# Patient Record
Sex: FEMALE | Race: WHITE | NOT HISPANIC OR LATINO | URBAN - METROPOLITAN AREA
[De-identification: names, ages, dates, MRNs, and addresses within clinical notes are randomized per-mention and may not be internally consistent; named-entity substitution may affect disease eponyms.]

---

## 2017-01-01 ENCOUNTER — INPATIENT (INPATIENT)
Facility: HOSPITAL | Age: 82
LOS: 8 days | Discharge: NURSING, REHAB OR LONG-TERM CARE | End: 2017-01-10
Attending: INTERNAL MEDICINE | Admitting: FAMILY MEDICINE
Payer: MEDICARE

## 2017-01-01 PROCEDURE — 71010: CPT | Mod: 26

## 2017-01-01 PROCEDURE — 93010 ELECTROCARDIOGRAM REPORT: CPT

## 2017-01-01 PROCEDURE — 73552 X-RAY EXAM OF FEMUR 2/>: CPT | Mod: 26

## 2017-01-01 PROCEDURE — 73502 X-RAY EXAM HIP UNI 2-3 VIEWS: CPT | Mod: 26,LT

## 2017-01-01 PROCEDURE — 99285 EMERGENCY DEPT VISIT HI MDM: CPT

## 2017-01-01 PROCEDURE — 73562 X-RAY EXAM OF KNEE 3: CPT | Mod: 26,LT

## 2017-01-02 PROCEDURE — 93010 ELECTROCARDIOGRAM REPORT: CPT

## 2017-01-02 PROCEDURE — 93306 TTE W/DOPPLER COMPLETE: CPT | Mod: 26

## 2017-01-03 PROCEDURE — 71250 CT THORAX DX C-: CPT | Mod: 26

## 2017-01-03 PROCEDURE — 71010: CPT | Mod: 26

## 2017-01-04 PROCEDURE — 78582 LUNG VENTILAT&PERFUS IMAGING: CPT | Mod: 26

## 2017-01-04 PROCEDURE — 71010: CPT | Mod: 26

## 2017-01-05 PROCEDURE — 71010: CPT | Mod: 26

## 2017-01-05 PROCEDURE — 93010 ELECTROCARDIOGRAM REPORT: CPT

## 2017-01-06 PROCEDURE — 76770 US EXAM ABDO BACK WALL COMP: CPT | Mod: 26

## 2017-01-13 DIAGNOSIS — S72.142A DISPLACED INTERTROCHANTERIC FRACTURE OF LEFT FEMUR, INITIAL ENCOUNTER FOR CLOSED FRACTURE: ICD-10-CM

## 2017-01-13 DIAGNOSIS — F17.200 NICOTINE DEPENDENCE, UNSPECIFIED, UNCOMPLICATED: ICD-10-CM

## 2017-01-13 DIAGNOSIS — J95.89 OTHER POSTPROCEDURAL COMPLICATIONS AND DISORDERS OF RESPIRATORY SYSTEM, NOT ELSEWHERE CLASSIFIED: ICD-10-CM

## 2017-01-13 DIAGNOSIS — J44.1 CHRONIC OBSTRUCTIVE PULMONARY DISEASE WITH (ACUTE) EXACERBATION: ICD-10-CM
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
## 2017-01-13 DIAGNOSIS — J98.11 ATELECTASIS: ICD-10-CM

## 2017-01-13 DIAGNOSIS — J95.821 ACUTE POSTPROCEDURAL RESPIRATORY FAILURE: ICD-10-CM

## 2017-01-13 DIAGNOSIS — E03.9 HYPOTHYROIDISM, UNSPECIFIED: ICD-10-CM

## 2017-01-13 DIAGNOSIS — J84.10 PULMONARY FIBROSIS, UNSPECIFIED: ICD-10-CM

## 2017-01-13 DIAGNOSIS — D63.8 ANEMIA IN OTHER CHRONIC DISEASES CLASSIFIED ELSEWHERE: ICD-10-CM

## 2017-01-13 DIAGNOSIS — D62 ACUTE POSTHEMORRHAGIC ANEMIA: ICD-10-CM

## 2017-01-13 DIAGNOSIS — I50.31 ACUTE DIASTOLIC (CONGESTIVE) HEART FAILURE: ICD-10-CM

## 2017-01-13 DIAGNOSIS — I48.91 UNSPECIFIED ATRIAL FIBRILLATION: ICD-10-CM

## 2017-01-13 DIAGNOSIS — I45.10 UNSPECIFIED RIGHT BUNDLE-BRANCH BLOCK: ICD-10-CM
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
## 2017-01-17 DIAGNOSIS — J18.9 PNEUMONIA, UNSPECIFIED ORGANISM: ICD-10-CM

## 2017-01-30 ENCOUNTER — INPATIENT (INPATIENT)
Facility: HOSPITAL | Age: 82
LOS: 8 days | Discharge: NURSING, REHAB OR LONG-TERM CARE | End: 2017-02-08
Attending: FAMILY MEDICINE | Admitting: INTERNAL MEDICINE
Payer: MEDICARE

## 2017-01-30 VITALS
SYSTOLIC BLOOD PRESSURE: 107 MMHG | OXYGEN SATURATION: 95 % | HEART RATE: 87 BPM | RESPIRATION RATE: 16 BRPM | DIASTOLIC BLOOD PRESSURE: 58 MMHG | TEMPERATURE: 98 F | WEIGHT: 160.06 LBS

## 2017-01-30 DIAGNOSIS — Z96.642 PRESENCE OF LEFT ARTIFICIAL HIP JOINT: Chronic | ICD-10-CM

## 2017-01-30 LAB
ADD ON TEST-SPECIMEN IN LAB: SIGNIFICANT CHANGE UP
ALBUMIN SERPL ELPH-MCNC: 2.8 G/DL — LOW (ref 3.3–5)
ALP SERPL-CCNC: 97 U/L — SIGNIFICANT CHANGE UP (ref 40–120)
ALT FLD-CCNC: 17 U/L — SIGNIFICANT CHANGE UP (ref 12–78)
ANION GAP SERPL CALC-SCNC: 9 MMOL/L — SIGNIFICANT CHANGE UP (ref 5–17)
AST SERPL-CCNC: 15 U/L — SIGNIFICANT CHANGE UP (ref 15–37)
BASE EXCESS BLDA CALC-SCNC: 0 MMOL/L — SIGNIFICANT CHANGE UP (ref -2–2)
BASE EXCESS BLDA CALC-SCNC: 2 MMOL/L — SIGNIFICANT CHANGE UP (ref -2–2)
BASOPHILS # BLD AUTO: 0.1 K/UL — SIGNIFICANT CHANGE UP (ref 0–0.2)
BASOPHILS NFR BLD AUTO: 0.9 % — SIGNIFICANT CHANGE UP (ref 0–2)
BILIRUB SERPL-MCNC: 0.7 MG/DL — SIGNIFICANT CHANGE UP (ref 0.2–1.2)
BUN SERPL-MCNC: 38 MG/DL — HIGH (ref 7–23)
CALCIUM SERPL-MCNC: 8.8 MG/DL — SIGNIFICANT CHANGE UP (ref 8.5–10.1)
CHLORIDE SERPL-SCNC: 96 MMOL/L — SIGNIFICANT CHANGE UP (ref 96–108)
CO2 SERPL-SCNC: 32 MMOL/L — HIGH (ref 22–31)
CREAT SERPL-MCNC: 2.58 MG/DL — HIGH (ref 0.5–1.3)
EOSINOPHIL # BLD AUTO: 0.2 K/UL — SIGNIFICANT CHANGE UP (ref 0–0.5)
EOSINOPHIL NFR BLD AUTO: 2.6 % — SIGNIFICANT CHANGE UP (ref 0–6)
FLUAV H1 2009 PAND RNA SPEC QL NAA+PROBE: DETECTED
GAS PNL BLDA: SIGNIFICANT CHANGE UP
GLUCOSE SERPL-MCNC: 77 MG/DL — SIGNIFICANT CHANGE UP (ref 70–99)
HCO3 BLDA-SCNC: 25 MMOL/L — SIGNIFICANT CHANGE UP (ref 21–29)
HCO3 BLDA-SCNC: 27 MMOL/L — SIGNIFICANT CHANGE UP (ref 21–29)
HCT VFR BLD CALC: 33.9 % — LOW (ref 34.5–45)
HGB BLD-MCNC: 10.9 G/DL — LOW (ref 11.5–15.5)
INR BLD: 5 RATIO — CRITICAL HIGH (ref 0.88–1.16)
LYMPHOCYTES # BLD AUTO: 1 K/UL — SIGNIFICANT CHANGE UP (ref 1–3.3)
LYMPHOCYTES # BLD AUTO: 16.4 % — SIGNIFICANT CHANGE UP (ref 13–44)
MCHC RBC-ENTMCNC: 31.2 PG — SIGNIFICANT CHANGE UP (ref 27–34)
MCHC RBC-ENTMCNC: 32.2 GM/DL — SIGNIFICANT CHANGE UP (ref 32–36)
MCV RBC AUTO: 97 FL — SIGNIFICANT CHANGE UP (ref 80–100)
MONOCYTES # BLD AUTO: 0.4 K/UL — SIGNIFICANT CHANGE UP (ref 0–0.9)
MONOCYTES NFR BLD AUTO: 6.9 % — SIGNIFICANT CHANGE UP (ref 2–14)
NEUTROPHILS # BLD AUTO: 4.4 K/UL — SIGNIFICANT CHANGE UP (ref 1.8–7.4)
NEUTROPHILS NFR BLD AUTO: 73.2 % — SIGNIFICANT CHANGE UP (ref 43–77)
PCO2 BLDA: 45 MMHG — SIGNIFICANT CHANGE UP (ref 32–46)
PCO2 BLDA: 47 MMHG — HIGH (ref 32–46)
PH BLDA: 7.36 — SIGNIFICANT CHANGE UP (ref 7.35–7.45)
PH BLDA: 7.37 — SIGNIFICANT CHANGE UP (ref 7.35–7.45)
PLATELET # BLD AUTO: 312 K/UL — SIGNIFICANT CHANGE UP (ref 150–400)
PO2 BLDA: 65 — SIGNIFICANT CHANGE UP
PO2 BLDA: 78 — SIGNIFICANT CHANGE UP
POTASSIUM SERPL-MCNC: 4.1 MMOL/L — SIGNIFICANT CHANGE UP (ref 3.5–5.3)
POTASSIUM SERPL-SCNC: 4.1 MMOL/L — SIGNIFICANT CHANGE UP (ref 3.5–5.3)
PROT SERPL-MCNC: 7.3 GM/DL — SIGNIFICANT CHANGE UP (ref 6–8.3)
PROTHROM AB SERPL-ACNC: 55.9 SEC — HIGH (ref 10–13.1)
RAPID RVP RESULT: DETECTED
RBC # BLD: 3.5 M/UL — LOW (ref 3.8–5.2)
RBC # FLD: 18 % — HIGH (ref 10.3–14.5)
SAO2 % BLDA: 88 — SIGNIFICANT CHANGE UP
SAO2 % BLDA: 94 — SIGNIFICANT CHANGE UP
SODIUM SERPL-SCNC: 137 MMOL/L — SIGNIFICANT CHANGE UP (ref 135–145)
WBC # BLD: 6.1 K/UL — SIGNIFICANT CHANGE UP (ref 3.8–10.5)
WBC # FLD AUTO: 6.1 K/UL — SIGNIFICANT CHANGE UP (ref 3.8–10.5)

## 2017-01-30 PROCEDURE — 93010 ELECTROCARDIOGRAM REPORT: CPT

## 2017-01-30 PROCEDURE — 71010: CPT | Mod: 26

## 2017-01-30 PROCEDURE — 99291 CRITICAL CARE FIRST HOUR: CPT

## 2017-01-30 RX ORDER — IPRATROPIUM BROMIDE 0.2 MG/ML
500 SOLUTION, NON-ORAL INHALATION EVERY 6 HOURS
Qty: 0 | Refills: 0 | Status: DISCONTINUED | OUTPATIENT
Start: 2017-01-30 | End: 2017-02-08

## 2017-01-30 RX ORDER — SODIUM CHLORIDE 9 MG/ML
1000 INJECTION INTRAMUSCULAR; INTRAVENOUS; SUBCUTANEOUS
Qty: 0 | Refills: 0 | Status: DISCONTINUED | OUTPATIENT
Start: 2017-01-30 | End: 2017-02-03

## 2017-01-30 RX ORDER — ERGOCALCIFEROL 1.25 MG/1
2000 CAPSULE ORAL
Qty: 0 | Refills: 0 | COMMUNITY

## 2017-01-30 RX ORDER — ASPIRIN/CALCIUM CARB/MAGNESIUM 324 MG
81 TABLET ORAL DAILY
Qty: 0 | Refills: 0 | Status: DISCONTINUED | OUTPATIENT
Start: 2017-01-30 | End: 2017-02-08

## 2017-01-30 RX ORDER — ASPIRIN/CALCIUM CARB/MAGNESIUM 324 MG
81 TABLET ORAL DAILY
Qty: 0 | Refills: 0 | Status: DISCONTINUED | OUTPATIENT
Start: 2017-01-30 | End: 2017-01-30

## 2017-01-30 RX ORDER — ERGOCALCIFEROL 1.25 MG/1
2000 CAPSULE ORAL DAILY
Qty: 0 | Refills: 0 | Status: DISCONTINUED | OUTPATIENT
Start: 2017-01-30 | End: 2017-02-08

## 2017-01-30 RX ORDER — CEFEPIME 1 G/1
500 INJECTION, POWDER, FOR SOLUTION INTRAMUSCULAR; INTRAVENOUS ONCE
Qty: 0 | Refills: 0 | Status: COMPLETED | OUTPATIENT
Start: 2017-01-30 | End: 2017-01-30

## 2017-01-30 RX ORDER — CEFEPIME 1 G/1
INJECTION, POWDER, FOR SOLUTION INTRAMUSCULAR; INTRAVENOUS
Qty: 0 | Refills: 0 | Status: DISCONTINUED | OUTPATIENT
Start: 2017-01-30 | End: 2017-02-05

## 2017-01-30 RX ORDER — CEFEPIME 1 G/1
1000 INJECTION, POWDER, FOR SOLUTION INTRAMUSCULAR; INTRAVENOUS ONCE
Qty: 0 | Refills: 0 | Status: COMPLETED | OUTPATIENT
Start: 2017-01-30 | End: 2017-01-30

## 2017-01-30 RX ORDER — CEFEPIME 1 G/1
500 INJECTION, POWDER, FOR SOLUTION INTRAMUSCULAR; INTRAVENOUS EVERY 12 HOURS
Qty: 0 | Refills: 0 | Status: DISCONTINUED | OUTPATIENT
Start: 2017-01-31 | End: 2017-02-05

## 2017-01-30 RX ORDER — WARFARIN SODIUM 2.5 MG/1
2 TABLET ORAL ONCE
Qty: 0 | Refills: 0 | Status: DISCONTINUED | OUTPATIENT
Start: 2017-01-30 | End: 2017-01-30

## 2017-01-30 RX ORDER — VANCOMYCIN HCL 1 G
1000 VIAL (EA) INTRAVENOUS ONCE
Qty: 0 | Refills: 0 | Status: COMPLETED | OUTPATIENT
Start: 2017-01-30 | End: 2017-01-30

## 2017-01-30 RX ORDER — CEFEPIME 1 G/1
INJECTION, POWDER, FOR SOLUTION INTRAMUSCULAR; INTRAVENOUS
Qty: 0 | Refills: 0 | Status: DISCONTINUED | OUTPATIENT
Start: 2017-01-30 | End: 2017-02-01

## 2017-01-30 RX ORDER — ALBUTEROL 90 UG/1
2.5 AEROSOL, METERED ORAL EVERY 6 HOURS
Qty: 0 | Refills: 0 | Status: DISCONTINUED | OUTPATIENT
Start: 2017-01-30 | End: 2017-02-08

## 2017-01-30 RX ORDER — CALCIUM CARBONATE 500(1250)
1 TABLET ORAL
Qty: 0 | Refills: 0 | Status: DISCONTINUED | OUTPATIENT
Start: 2017-01-30 | End: 2017-02-08

## 2017-01-30 RX ORDER — ASPIRIN/CALCIUM CARB/MAGNESIUM 324 MG
1 TABLET ORAL
Qty: 0 | Refills: 0 | COMMUNITY

## 2017-01-30 RX ORDER — SODIUM CHLORIDE 9 MG/ML
2000 INJECTION INTRAMUSCULAR; INTRAVENOUS; SUBCUTANEOUS ONCE
Qty: 0 | Refills: 0 | Status: COMPLETED | OUTPATIENT
Start: 2017-01-30 | End: 2017-01-30

## 2017-01-30 RX ORDER — CEFEPIME 1 G/1
1000 INJECTION, POWDER, FOR SOLUTION INTRAMUSCULAR; INTRAVENOUS EVERY 12 HOURS
Qty: 0 | Refills: 0 | Status: DISCONTINUED | OUTPATIENT
Start: 2017-01-30 | End: 2017-02-01

## 2017-01-30 RX ORDER — CALCIUM CARBONATE 500(1250)
1 TABLET ORAL
Qty: 0 | Refills: 0 | Status: DISCONTINUED | OUTPATIENT
Start: 2017-01-30 | End: 2017-01-30

## 2017-01-30 RX ORDER — WARFARIN SODIUM 2.5 MG/1
1 TABLET ORAL
Qty: 0 | Refills: 0 | COMMUNITY

## 2017-01-30 RX ORDER — CALCIUM CARBONATE 500(1250)
0 TABLET ORAL
Qty: 0 | Refills: 0 | COMMUNITY

## 2017-01-30 RX ADMIN — Medication 500 MICROGRAM(S): at 14:33

## 2017-01-30 RX ADMIN — ALBUTEROL 2.5 MILLIGRAM(S): 90 AEROSOL, METERED ORAL at 14:34

## 2017-01-30 RX ADMIN — CEFEPIME 100 MILLIGRAM(S): 1 INJECTION, POWDER, FOR SOLUTION INTRAMUSCULAR; INTRAVENOUS at 03:32

## 2017-01-30 RX ADMIN — SODIUM CHLORIDE 75 MILLILITER(S): 9 INJECTION INTRAMUSCULAR; INTRAVENOUS; SUBCUTANEOUS at 22:03

## 2017-01-30 RX ADMIN — Medication 250 MILLIGRAM(S): at 04:14

## 2017-01-30 RX ADMIN — SODIUM CHLORIDE 2000 MILLILITER(S): 9 INJECTION INTRAMUSCULAR; INTRAVENOUS; SUBCUTANEOUS at 01:44

## 2017-01-30 RX ADMIN — Medication 60 MILLIGRAM(S): at 23:49

## 2017-01-30 RX ADMIN — ERGOCALCIFEROL 2000 UNIT(S): 1.25 CAPSULE ORAL at 12:51

## 2017-01-30 RX ADMIN — Medication 1 TABLET(S): at 23:51

## 2017-01-30 RX ADMIN — CEFEPIME 100 MILLIGRAM(S): 1 INJECTION, POWDER, FOR SOLUTION INTRAMUSCULAR; INTRAVENOUS at 13:59

## 2017-01-30 NOTE — H&P ADULT - ASSESSMENT
1. Acute on chronic Resp failure due to AECopd and Influenza  Admit to monitored floor  cw Bipap  Pulmonary evaluation  Unlikely PE with an INR=5    2. AECopd:  Solumedrol 40mg IV q6hrs  Albuterol/Atrovent nebs  Bipap mask  Pulmonary evaluation  Cefepime 0.5gm q12hrs for probable underlying bacterial resp infection    3. Influenza A  Tamiflu 75mg po BID for 5days    4. ARF superimposed on Ckd stg III:  gentle hydration  caution with HFpEF    5. AOCD: stable Hb    6. Atrial fibrillation:   rate controlled, cw Cardizem 60mg po q8hrs  INR=5, hold Coumadin

## 2017-01-30 NOTE — ED PROVIDER NOTE - OBJECTIVE STATEMENT
81 F presents from rehab facility for eval of hypoxia.  Pt unable to give any further information.  She denies any co HA, dizziness, cp, sob abd pain fever, nvd.

## 2017-01-30 NOTE — ED PROVIDER NOTE - CARE PLAN
Principal Discharge DX:	HCAP (healthcare-associated pneumonia)  Secondary Diagnosis:	Acute congestive heart failure, unspecified congestive heart failure type

## 2017-01-30 NOTE — PATIENT PROFILE ADULT. - PSH
S/P hip replacement, left  previous ORIF to left hip about 2wks prior to admission H/O prior ablation treatment  chemical  S/P hip replacement, left  previous ORIF to left hip about 2wks prior to admission

## 2017-01-30 NOTE — ED PROVIDER NOTE - MUSCULOSKELETAL, MLM
Spine appears normal, range of motion is not limited, (+) min joint tenderness over Lt hip. (+) well approximated healing scar to area..  No distal edema

## 2017-01-30 NOTE — ED PROVIDER NOTE - ENMT, MLM
Airway patent, Nasal mucosa clear. Dry oral mucosa mucosa. Throat has no vesicles, no oropharyngeal exudates and uvula is midline.

## 2017-01-30 NOTE — H&P ADULT - NSHPPHYSICALEXAM_GEN_ALL_CORE
PHYSICAL EXAM:    GENERAL: in distress  HEAD:  Atraumatic, Normocephalic  EYES: EOMI, PERRLA, conjunctiva and sclera clear  HEENT: Moist mucous membranes  NECK: Supple, No JVD  NERVOUS SYSTEM:  Alert & Oriented X2, Motor Strength 5/5 B/L upper and lower extremities; DTRs 2+ intact and symmetric  CHEST/LUNG: decreased air entry bilaterally  HEART: Irregular rhythm; No murmurs, rubs, or gallops  ABDOMEN: Soft, Nontender, Nondistended; Bowel sounds present  GENITOURINARY- Voiding, no palpable bladder  EXTREMITIES:  2+ Peripheral Pulses, No clubbing, cyanosis, or edema  MUSCULOSKELTAL- No muscle tenderness, Muscle tone normal, No joint tenderness, no Joint swelling, Joint range of motion-normal  SKIN-no rash, no lesion

## 2017-01-30 NOTE — ED ADULT NURSE REASSESSMENT NOTE - NS ED NURSE REASSESS COMMENT FT1
pt received at 0700, resting comfortably in bed, no complaints at this time, CPAP in place with O2 sat at 96 percent. All needs anticipated and met, safety maintained, will continue to monitor

## 2017-01-30 NOTE — H&P ADULT - HISTORY OF PRESENT ILLNESS
81 year old female with h/o Hypothyroidism s/p chemical ablation , L hip fracture s/p Orif 1/1/2017, COPD, Chronic Respiratory  failure on home O2, Afib on Vka, HFpEF, Ckd stg III, Chonic Anemia presents from [facility] with hypoxia. Patient has been c/o malaise and coughing for the past few days. She was noted to have profound hypoxia and was sent to the ED  -found to have AECopd and Influenza A

## 2017-01-30 NOTE — ED ADULT NURSE REASSESSMENT NOTE - NS ED NURSE REASSESS COMMENT FT1
Attempted to place patient on nasal cannula, patient pulse ox dropped to 88. Dr. Giron aware, patient placed on bipap. Patient with +Influenza A. Will continue to monitor.

## 2017-01-30 NOTE — H&P ADULT - NSHPLABSRESULTS_GEN_ALL_CORE
10.9   6.1   )-----------( 312      ( 30 Jan 2017 00:48 )             33.9       137    |  96     |  38     ----------------------------<  77     4.1     |  32     |  2.58     Ca    8.8           TPro  7.3    /  Alb  2.8    /  TBili  0.7    /  DBili  x      /  AST  15     /  ALT  17     /  AlkPhos  97     30 Jan 2017 00:48    PT/INR - ( 30 Jan 2017 00:48 )   PT: 55.9 sec;   INR: 5.00 ratio         ABG - ( 30 Jan 2017 06:05 )  pH: 7.37  /  pCO2: 47    /  pO2: 65    / HCO3: 27    / Base Excess: 2     /  SaO2: 88                CARDIAC MARKERS ( 30 Jan 2017 00:48 )  <0.015 ng/mL / x     / 50 U/L / x     / x

## 2017-01-30 NOTE — PATIENT PROFILE ADULT. - PMH
Atrial fibrillation, unspecified type Acute congestive heart failure, unspecified congestive heart failure type    Anemia, unspecified type    Atrial fibrillation, unspecified type    Chronic obstructive pulmonary disease, unspecified COPD type    Hypothyroidism, unspecified type    Stage 2 chronic kidney disease

## 2017-01-30 NOTE — ED ADULT NURSE REASSESSMENT NOTE - NS ED NURSE REASSESS COMMENT FT1
pt not tolerating 50 percent venti mask, O2 sat in 80s resp called, pt RR normal, Pt placed on partial venti mask with O2 sat in high 90s will continue to montor

## 2017-01-30 NOTE — ED ADULT NURSE REASSESSMENT NOTE - NS ED NURSE REASSESS COMMENT FT1
Pt taken off Bipap and placed on 50 percent venti mask, not tolerated well, pt O2 sat dropped into low 80s. respiratory called, pt placed on nonrebreather which brbrought O2 sat to 100 percent, respiratory placed pt back on bipap. o2 sat in high 90s. will continue to monitor

## 2017-01-31 DIAGNOSIS — Z98.890 OTHER SPECIFIED POSTPROCEDURAL STATES: Chronic | ICD-10-CM

## 2017-01-31 LAB
ANION GAP SERPL CALC-SCNC: 9 MMOL/L — SIGNIFICANT CHANGE UP (ref 5–17)
BUN SERPL-MCNC: 27 MG/DL — HIGH (ref 7–23)
CALCIUM SERPL-MCNC: 8 MG/DL — LOW (ref 8.5–10.1)
CHLORIDE SERPL-SCNC: 102 MMOL/L — SIGNIFICANT CHANGE UP (ref 96–108)
CO2 SERPL-SCNC: 30 MMOL/L — SIGNIFICANT CHANGE UP (ref 22–31)
CREAT SERPL-MCNC: 1.57 MG/DL — HIGH (ref 0.5–1.3)
CULTURE RESULTS: NO GROWTH — SIGNIFICANT CHANGE UP
GLUCOSE SERPL-MCNC: 133 MG/DL — HIGH (ref 70–99)
HCT VFR BLD CALC: 32.6 % — LOW (ref 34.5–45)
HGB BLD-MCNC: 10 G/DL — LOW (ref 11.5–15.5)
INR BLD: 6.07 RATIO — CRITICAL HIGH (ref 0.88–1.16)
POTASSIUM SERPL-MCNC: 4.2 MMOL/L — SIGNIFICANT CHANGE UP (ref 3.5–5.3)
POTASSIUM SERPL-SCNC: 4.2 MMOL/L — SIGNIFICANT CHANGE UP (ref 3.5–5.3)
PROTHROM AB SERPL-ACNC: 68 SEC — HIGH (ref 10–13.1)
SODIUM SERPL-SCNC: 141 MMOL/L — SIGNIFICANT CHANGE UP (ref 135–145)
SPECIMEN SOURCE: SIGNIFICANT CHANGE UP

## 2017-01-31 RX ORDER — ACETAMINOPHEN 500 MG
650 TABLET ORAL EVERY 6 HOURS
Qty: 0 | Refills: 0 | Status: DISCONTINUED | OUTPATIENT
Start: 2017-01-31 | End: 2017-02-08

## 2017-01-31 RX ORDER — WARFARIN SODIUM 2.5 MG/1
2 TABLET ORAL
Qty: 0 | Refills: 0 | Status: COMPLETED | OUTPATIENT
Start: 2017-01-31 | End: 2017-02-02

## 2017-01-31 RX ORDER — SODIUM CHLORIDE 9 MG/ML
500 INJECTION INTRAMUSCULAR; INTRAVENOUS; SUBCUTANEOUS
Qty: 0 | Refills: 0 | Status: DISCONTINUED | OUTPATIENT
Start: 2017-01-31 | End: 2017-02-03

## 2017-01-31 RX ADMIN — Medication 500 MICROGRAM(S): at 02:56

## 2017-01-31 RX ADMIN — Medication 500 MICROGRAM(S): at 13:40

## 2017-01-31 RX ADMIN — ALBUTEROL 2.5 MILLIGRAM(S): 90 AEROSOL, METERED ORAL at 02:56

## 2017-01-31 RX ADMIN — Medication 1 TABLET(S): at 07:13

## 2017-01-31 RX ADMIN — Medication 1 TABLET(S): at 05:40

## 2017-01-31 RX ADMIN — ALBUTEROL 2.5 MILLIGRAM(S): 90 AEROSOL, METERED ORAL at 13:41

## 2017-01-31 RX ADMIN — Medication 500 MICROGRAM(S): at 20:57

## 2017-01-31 RX ADMIN — Medication 81 MILLIGRAM(S): at 12:08

## 2017-01-31 RX ADMIN — CEFEPIME 100 MILLIGRAM(S): 1 INJECTION, POWDER, FOR SOLUTION INTRAMUSCULAR; INTRAVENOUS at 19:00

## 2017-01-31 RX ADMIN — ALBUTEROL 2.5 MILLIGRAM(S): 90 AEROSOL, METERED ORAL at 20:57

## 2017-01-31 RX ADMIN — Medication 30 MILLIGRAM(S): at 12:08

## 2017-01-31 RX ADMIN — Medication 40 MILLIGRAM(S): at 21:53

## 2017-01-31 RX ADMIN — CEFEPIME 100 MILLIGRAM(S): 1 INJECTION, POWDER, FOR SOLUTION INTRAMUSCULAR; INTRAVENOUS at 05:40

## 2017-01-31 RX ADMIN — Medication 60 MILLIGRAM(S): at 05:40

## 2017-01-31 NOTE — H&P CARDIOLOGY - HISTORY OF PRESENT ILLNESS
CHIEF COMPLAINT: Patient is a 81y old  Female who presents with a chief complaint of Resp Failure (30 Jan 2017 11:26)      HPI:  81 year old female with h/o Hypothyroidism s/p chemical ablation , L hip fracture s/p Orif 1/1/2017, COPD, Chronic Respiratory  failure on home O2, Afib on Vka, HFpEF, Ckd stg III, Chonic Anemia presents from [facility] with hypoxia. Patient has been c/o malaise and coughing for the past few days. She was noted to have profound hypoxia and was sent to the ED  -found to have AECopd and Influenza A (30 Jan 2017 11:26)      PMHx: PAST MEDICAL & SURGICAL HISTORY:  Anemia, unspecified type  Stage 2 chronic kidney disease  Acute congestive heart failure, unspecified congestive heart failure type  Hypothyroidism, unspecified type  Chronic obstructive pulmonary disease, unspecified COPD type  Atrial fibrillation, unspecified type  H/O prior ablation treatment: chemical  S/P hip replacement, left: previous ORIF to left hip about 2wks prior to admission  No significant past surgical history        Soc Hx:  lives in [place]      Allergies: Allergies    No Known Allergies        REVIEW OF SYSTEMS:    CONSTITUTIONAL: No weakness, fevers or chills  EYES/ENT: No visual changes;  No vertigo or throat pain   NECK: No pain or stiffness  RESPIRATORY: No cough, wheezing, hemoptysis; No shortness of breath  CARDIOVASCULAR: No chest pain or palpitations  GASTROINTESTINAL: No abdominal or epigastric pain. No nausea, vomiting, or hematemesis; No diarrhea or constipation. No melena or hematochezia.  GENITOURINARY: No dysuria, frequency or hematuria  NEUROLOGICAL: No numbness or weakness  SKIN: No itching, burning, rashes, or lesions   All other review of systems is negative unless indicated above    Vital Signs Last 24 Hrs  T(C): 36.9, Max: 36.9 (01-30 @ 20:00)  T(F): 98.4, Max: 98.5 (01-30 @ 20:00)  HR: 81 (73 - 90)  BP: 98/55 (60/51 - 102/60)  BP(mean): 62 (47 - 62)  RR: 20 (12 - 25)  SpO2: 99% (81% - 100%)    I&O's Summary    I & Os for current day (as of 31 Jan 2017 07:13)  =============================================  IN: 605 ml / OUT: 0 ml / NET: 605 ml          PHYSICAL EXAM:   Constitutional: NAD, awake and alert, well-developed  HEENT: PERR, EOMI, Normal Hearing, MMM  Neck: Soft and supple, No LAD, No JVD  Respiratory: Breath sounds are clear bilaterally, No wheezing, rales or rhonchi  Cardiovascular: S1 and S2, regular rate and rhythm, no Murmurs, gallops or rubs  Gastrointestinal: Bowel Sounds present, soft, nontender, nondistended, no guarding, no rebound  Extremities: No peripheral edema  Vascular: 2+ peripheral pulses  Neurological: A/O x 3, no focal deficits  Musculoskeletal: 5/5 strength b/l upper and lower extremities  Skin: No rashes    MEDICATIONS:  MEDICATIONS  (STANDING):  cefepime  IVPB  IV Intermittent   cefepime  IVPB 1000milliGRAM(s) IV Intermittent every 12 hours  ergocalciferol Drops 2000Unit(s) Oral daily  diltiazem    Tablet 60milliGRAM(s) Oral every 8 hours  cefepime  IVPB  IV Intermittent   ALBUTerol    0.083% 2.5milliGRAM(s) Nebulizer every 6 hours  sodium chloride 0.9%. 1000milliLiter(s) IV Continuous <Continuous>  ipratropium    for Nebulization 500MICROGram(s) Inhalation every 6 hours  cefepime  IVPB 500milliGRAM(s) IV Intermittent every 12 hours  aspirin  chewable 81milliGRAM(s) Oral daily  methylPREDNISolone sodium succinate Injectable 60milliGRAM(s) IV Push every 6 hours  oseltamivir 30milliGRAM(s) Oral daily  calcium carbonate 1250 mG (OsCal) 1Tablet(s) Oral two times a day      LABS: All Labs Reviewed:                        10.0   x     )-----------( x        ( 31 Jan 2017 05:50 )             32.6     30 Jan 2017 00:48    137    |  96     |  38     ----------------------------<  77     4.1     |  32     |  2.58     Ca    8.8        30 Jan 2017 00:48    TPro  7.3    /  Alb  2.8    /  TBili  0.7    /  DBili  x      /  AST  15     /  ALT  17     /  AlkPhos  97     30 Jan 2017 00:48    PT/INR - ( 30 Jan 2017 00:48 )   PT: 55.9 sec;   INR: 5.00 ratio           CARDIAC MARKERS ( 30 Jan 2017 00:48 )  <0.015 ng/mL / x     / 50 U/L / x     / x          Serum Pro-Brain Natriuretic Peptide: 73672 pg/mL (01-30 @ 00:48)    Blood Culture:   BNP     RADIOLOGY:  Atrial fibrillation  Low voltage QRS  RSR' or QR pattern in V1 suggests right ventricular conduction delay  ST & T wave abnormality, consider anterolateral ischemia  Abnormal ECG  EKG:     Telemetry: atrial fuibrillation    ECHO:

## 2017-01-31 NOTE — H&P CARDIOLOGY - COMMENTS
81 year old woman with AF on AC, COPD presents with COPD exacerbation and infuuenza with hypoxia    AF- rate controlled continue cardizem- , goal INR 2-3    Hypoxia- supplemental o2, abx, antivirals and steroids, nebs as per medicine    Diastolic HF- monitor I/O, hold lasix for now, daily weights

## 2017-02-01 LAB
ANION GAP SERPL CALC-SCNC: 6 MMOL/L — SIGNIFICANT CHANGE UP (ref 5–17)
BASE EXCESS BLDA CALC-SCNC: 2 MMOL/L — SIGNIFICANT CHANGE UP (ref -2–2)
BLOOD GAS COMMENTS ARTERIAL: SIGNIFICANT CHANGE UP
BUN SERPL-MCNC: 26 MG/DL — HIGH (ref 7–23)
CALCIUM SERPL-MCNC: 8.2 MG/DL — LOW (ref 8.5–10.1)
CHLORIDE SERPL-SCNC: 106 MMOL/L — SIGNIFICANT CHANGE UP (ref 96–108)
CO2 SERPL-SCNC: 30 MMOL/L — SIGNIFICANT CHANGE UP (ref 22–31)
CREAT SERPL-MCNC: 1.45 MG/DL — HIGH (ref 0.5–1.3)
GLUCOSE SERPL-MCNC: 136 MG/DL — HIGH (ref 70–99)
HCO3 BLDA-SCNC: 29 MMOL/L — SIGNIFICANT CHANGE UP (ref 21–29)
HOROWITZ INDEX BLDA+IHG-RTO: 100 — SIGNIFICANT CHANGE UP
INR BLD: 5.27 RATIO — CRITICAL HIGH (ref 0.88–1.16)
PCO2 BLDA: 59 MMHG — HIGH (ref 32–46)
PH BLDA: 7.32 — LOW (ref 7.35–7.45)
PO2 BLDA: 77 — SIGNIFICANT CHANGE UP
POTASSIUM SERPL-MCNC: 4.4 MMOL/L — SIGNIFICANT CHANGE UP (ref 3.5–5.3)
POTASSIUM SERPL-SCNC: 4.4 MMOL/L — SIGNIFICANT CHANGE UP (ref 3.5–5.3)
PROTHROM AB SERPL-ACNC: 58.9 SEC — HIGH (ref 10–13.1)
SAO2 % BLDA: 93 — SIGNIFICANT CHANGE UP
SODIUM SERPL-SCNC: 142 MMOL/L — SIGNIFICANT CHANGE UP (ref 135–145)

## 2017-02-01 RX ADMIN — Medication 1 TABLET(S): at 05:43

## 2017-02-01 RX ADMIN — SODIUM CHLORIDE 75 MILLILITER(S): 9 INJECTION INTRAMUSCULAR; INTRAVENOUS; SUBCUTANEOUS at 00:21

## 2017-02-01 RX ADMIN — Medication 500 MICROGRAM(S): at 08:44

## 2017-02-01 RX ADMIN — Medication 40 MILLIGRAM(S): at 18:13

## 2017-02-01 RX ADMIN — Medication 40 MILLIGRAM(S): at 05:40

## 2017-02-01 RX ADMIN — Medication 1 TABLET(S): at 18:10

## 2017-02-01 RX ADMIN — CEFEPIME 100 MILLIGRAM(S): 1 INJECTION, POWDER, FOR SOLUTION INTRAMUSCULAR; INTRAVENOUS at 18:10

## 2017-02-01 RX ADMIN — Medication 40 MILLIGRAM(S): at 22:55

## 2017-02-01 RX ADMIN — ALBUTEROL 2.5 MILLIGRAM(S): 90 AEROSOL, METERED ORAL at 08:34

## 2017-02-01 RX ADMIN — Medication 500 MICROGRAM(S): at 19:19

## 2017-02-01 RX ADMIN — Medication 30 MILLIGRAM(S): at 18:15

## 2017-02-01 RX ADMIN — Medication 500 MICROGRAM(S): at 14:08

## 2017-02-01 RX ADMIN — Medication 500 MICROGRAM(S): at 08:47

## 2017-02-01 RX ADMIN — ALBUTEROL 2.5 MILLIGRAM(S): 90 AEROSOL, METERED ORAL at 19:19

## 2017-02-01 RX ADMIN — Medication 81 MILLIGRAM(S): at 18:21

## 2017-02-01 RX ADMIN — CEFEPIME 100 MILLIGRAM(S): 1 INJECTION, POWDER, FOR SOLUTION INTRAMUSCULAR; INTRAVENOUS at 05:41

## 2017-02-01 RX ADMIN — ALBUTEROL 2.5 MILLIGRAM(S): 90 AEROSOL, METERED ORAL at 14:08

## 2017-02-01 NOTE — CDI QUERY NOTE - NSCDIOTHERTXTBX_GEN_ALL_CORE_HH
Suspected superimposed pneumonia has been documented.  IV cefepime q12h for probable underlying bacterial respiratory infection has been documented.    Please clarify if pneumonia has been ruled-in ? ruled-out ? resolving ? other?    If pneumonia is ruled-in please clarify the type of pneumonia that is being treated necessitating the use of IV cefepime.  ie.. suspected GNR pneumonia ?  ..... likely MRSA pneumonia ?   ..... other ? please clarify Suspected superimposed pneumonia has been documented by pulmonary.  IV cefepime q12h for probable underlying bacterial respiratory infection has been documented.    Please clarify if pneumonia has been ruled-in ? ruled-out ? resolving ? other?    If pneumonia is ruled-in please clarify the type of pneumonia that is being treated necessitating the use of IV cefepime.  ie.. suspected GNR pneumonia ?  ..... likely MRSA pneumonia ?   ..... other ? please clarify

## 2017-02-02 LAB
INR BLD: 3.68 RATIO — HIGH (ref 0.88–1.16)
INR BLD: 3.78 RATIO — HIGH (ref 0.88–1.16)
PROTHROM AB SERPL-ACNC: 41 SEC — HIGH (ref 10–13.1)
PROTHROM AB SERPL-ACNC: 42.1 SEC — HIGH (ref 10–13.1)

## 2017-02-02 RX ORDER — NICOTINE POLACRILEX 2 MG
1 GUM BUCCAL DAILY
Qty: 0 | Refills: 0 | Status: DISCONTINUED | OUTPATIENT
Start: 2017-02-02 | End: 2017-02-08

## 2017-02-02 RX ADMIN — Medication 40 MILLIGRAM(S): at 17:53

## 2017-02-02 RX ADMIN — ALBUTEROL 2.5 MILLIGRAM(S): 90 AEROSOL, METERED ORAL at 08:52

## 2017-02-02 RX ADMIN — Medication 30 MILLIGRAM(S): at 16:05

## 2017-02-02 RX ADMIN — CEFEPIME 100 MILLIGRAM(S): 1 INJECTION, POWDER, FOR SOLUTION INTRAMUSCULAR; INTRAVENOUS at 06:46

## 2017-02-02 RX ADMIN — ALBUTEROL 2.5 MILLIGRAM(S): 90 AEROSOL, METERED ORAL at 01:04

## 2017-02-02 RX ADMIN — Medication 500 MICROGRAM(S): at 20:57

## 2017-02-02 RX ADMIN — ALBUTEROL 2.5 MILLIGRAM(S): 90 AEROSOL, METERED ORAL at 13:16

## 2017-02-02 RX ADMIN — ALBUTEROL 2.5 MILLIGRAM(S): 90 AEROSOL, METERED ORAL at 20:56

## 2017-02-02 RX ADMIN — Medication 1 TABLET(S): at 12:26

## 2017-02-02 RX ADMIN — ERGOCALCIFEROL 2000 UNIT(S): 1.25 CAPSULE ORAL at 16:07

## 2017-02-02 RX ADMIN — Medication 500 MICROGRAM(S): at 08:52

## 2017-02-02 RX ADMIN — CEFEPIME 100 MILLIGRAM(S): 1 INJECTION, POWDER, FOR SOLUTION INTRAMUSCULAR; INTRAVENOUS at 17:53

## 2017-02-02 RX ADMIN — Medication 500 MICROGRAM(S): at 01:04

## 2017-02-02 RX ADMIN — Medication 81 MILLIGRAM(S): at 12:28

## 2017-02-02 RX ADMIN — Medication 40 MILLIGRAM(S): at 21:08

## 2017-02-02 RX ADMIN — Medication 1 TABLET(S): at 17:52

## 2017-02-02 RX ADMIN — Medication 40 MILLIGRAM(S): at 06:45

## 2017-02-02 RX ADMIN — Medication 500 MICROGRAM(S): at 13:16

## 2017-02-03 LAB
ADD ON TEST-SPECIMEN IN LAB: SIGNIFICANT CHANGE UP
ANION GAP SERPL CALC-SCNC: 6 MMOL/L — SIGNIFICANT CHANGE UP (ref 5–17)
BASE EXCESS BLDA CALC-SCNC: 5 MMOL/L — HIGH (ref -2–2)
BLOOD GAS COMMENTS ARTERIAL: SIGNIFICANT CHANGE UP
BUN SERPL-MCNC: 25 MG/DL — HIGH (ref 7–23)
CALCIUM SERPL-MCNC: 8.8 MG/DL — SIGNIFICANT CHANGE UP (ref 8.5–10.1)
CHLORIDE SERPL-SCNC: 104 MMOL/L — SIGNIFICANT CHANGE UP (ref 96–108)
CO2 SERPL-SCNC: 31 MMOL/L — SIGNIFICANT CHANGE UP (ref 22–31)
CREAT SERPL-MCNC: 1.39 MG/DL — HIGH (ref 0.5–1.3)
GLUCOSE SERPL-MCNC: 154 MG/DL — HIGH (ref 70–99)
HCO3 BLDA-SCNC: 31 MMOL/L — HIGH (ref 21–29)
HCT VFR BLD CALC: 34 % — LOW (ref 34.5–45)
HGB BLD-MCNC: 10.3 G/DL — LOW (ref 11.5–15.5)
HOROWITZ INDEX BLDA+IHG-RTO: SIGNIFICANT CHANGE UP
INR BLD: 3.71 RATIO — HIGH (ref 0.88–1.16)
MCHC RBC-ENTMCNC: 30.4 GM/DL — LOW (ref 32–36)
MCHC RBC-ENTMCNC: 31.5 PG — SIGNIFICANT CHANGE UP (ref 27–34)
MCV RBC AUTO: 103.7 FL — HIGH (ref 80–100)
NT-PROBNP SERPL-SCNC: HIGH PG/ML (ref 0–450)
PCO2 BLDA: 58 MMHG — HIGH (ref 32–46)
PH BLDA: 7.35 — SIGNIFICANT CHANGE UP (ref 7.35–7.45)
PLATELET # BLD AUTO: 308 K/UL — SIGNIFICANT CHANGE UP (ref 150–400)
PO2 BLDA: 68 — SIGNIFICANT CHANGE UP
POTASSIUM SERPL-MCNC: 4.4 MMOL/L — SIGNIFICANT CHANGE UP (ref 3.5–5.3)
POTASSIUM SERPL-SCNC: 4.4 MMOL/L — SIGNIFICANT CHANGE UP (ref 3.5–5.3)
PROTHROM AB SERPL-ACNC: 41.3 SEC — HIGH (ref 10–13.1)
RBC # BLD: 3.28 M/UL — LOW (ref 3.8–5.2)
RBC # FLD: 23.4 % — HIGH (ref 10.3–14.5)
SAO2 % BLDA: 92 — SIGNIFICANT CHANGE UP
SODIUM SERPL-SCNC: 141 MMOL/L — SIGNIFICANT CHANGE UP (ref 135–145)
WBC # BLD: 10.2 K/UL — SIGNIFICANT CHANGE UP (ref 3.8–10.5)
WBC # FLD AUTO: 10.2 K/UL — SIGNIFICANT CHANGE UP (ref 3.8–10.5)

## 2017-02-03 PROCEDURE — 71010: CPT | Mod: 26

## 2017-02-03 RX ORDER — FUROSEMIDE 40 MG
40 TABLET ORAL ONCE
Qty: 0 | Refills: 0 | Status: COMPLETED | OUTPATIENT
Start: 2017-02-03 | End: 2017-02-03

## 2017-02-03 RX ORDER — ONDANSETRON 8 MG/1
4 TABLET, FILM COATED ORAL ONCE
Qty: 0 | Refills: 0 | Status: COMPLETED | OUTPATIENT
Start: 2017-02-03 | End: 2017-02-03

## 2017-02-03 RX ADMIN — ALBUTEROL 2.5 MILLIGRAM(S): 90 AEROSOL, METERED ORAL at 01:23

## 2017-02-03 RX ADMIN — Medication 81 MILLIGRAM(S): at 11:34

## 2017-02-03 RX ADMIN — ALBUTEROL 2.5 MILLIGRAM(S): 90 AEROSOL, METERED ORAL at 08:32

## 2017-02-03 RX ADMIN — Medication 40 MILLIGRAM(S): at 22:00

## 2017-02-03 RX ADMIN — ALBUTEROL 2.5 MILLIGRAM(S): 90 AEROSOL, METERED ORAL at 20:00

## 2017-02-03 RX ADMIN — Medication 1 TABLET(S): at 08:41

## 2017-02-03 RX ADMIN — CEFEPIME 100 MILLIGRAM(S): 1 INJECTION, POWDER, FOR SOLUTION INTRAMUSCULAR; INTRAVENOUS at 22:00

## 2017-02-03 RX ADMIN — Medication 30 MILLIGRAM(S): at 11:34

## 2017-02-03 RX ADMIN — Medication 40 MILLIGRAM(S): at 09:02

## 2017-02-03 RX ADMIN — Medication 500 MICROGRAM(S): at 14:52

## 2017-02-03 RX ADMIN — Medication 40 MILLIGRAM(S): at 14:10

## 2017-02-03 RX ADMIN — CEFEPIME 100 MILLIGRAM(S): 1 INJECTION, POWDER, FOR SOLUTION INTRAMUSCULAR; INTRAVENOUS at 09:08

## 2017-02-03 RX ADMIN — ALBUTEROL 2.5 MILLIGRAM(S): 90 AEROSOL, METERED ORAL at 14:52

## 2017-02-03 RX ADMIN — ERGOCALCIFEROL 2000 UNIT(S): 1.25 CAPSULE ORAL at 14:25

## 2017-02-03 RX ADMIN — Medication 1 PATCH: at 11:33

## 2017-02-03 RX ADMIN — Medication 1 TABLET(S): at 18:01

## 2017-02-03 RX ADMIN — Medication 500 MICROGRAM(S): at 20:00

## 2017-02-03 RX ADMIN — Medication 500 MICROGRAM(S): at 08:32

## 2017-02-03 RX ADMIN — Medication 500 MICROGRAM(S): at 01:22

## 2017-02-03 RX ADMIN — ONDANSETRON 4 MILLIGRAM(S): 8 TABLET, FILM COATED ORAL at 23:25

## 2017-02-04 LAB
ANION GAP SERPL CALC-SCNC: 5 MMOL/L — SIGNIFICANT CHANGE UP (ref 5–17)
BUN SERPL-MCNC: 28 MG/DL — HIGH (ref 7–23)
CALCIUM SERPL-MCNC: 9.3 MG/DL — SIGNIFICANT CHANGE UP (ref 8.5–10.1)
CHLORIDE SERPL-SCNC: 97 MMOL/L — SIGNIFICANT CHANGE UP (ref 96–108)
CO2 SERPL-SCNC: 37 MMOL/L — HIGH (ref 22–31)
CREAT SERPL-MCNC: 1.3 MG/DL — SIGNIFICANT CHANGE UP (ref 0.5–1.3)
CULTURE RESULTS: SIGNIFICANT CHANGE UP
CULTURE RESULTS: SIGNIFICANT CHANGE UP
GLUCOSE SERPL-MCNC: 138 MG/DL — HIGH (ref 70–99)
INR BLD: 2.71 RATIO — HIGH (ref 0.88–1.16)
POTASSIUM SERPL-MCNC: 4.3 MMOL/L — SIGNIFICANT CHANGE UP (ref 3.5–5.3)
POTASSIUM SERPL-SCNC: 4.3 MMOL/L — SIGNIFICANT CHANGE UP (ref 3.5–5.3)
PROTHROM AB SERPL-ACNC: 30.1 SEC — HIGH (ref 10–13.1)
SODIUM SERPL-SCNC: 139 MMOL/L — SIGNIFICANT CHANGE UP (ref 135–145)
SPECIMEN SOURCE: SIGNIFICANT CHANGE UP
SPECIMEN SOURCE: SIGNIFICANT CHANGE UP

## 2017-02-04 RX ORDER — WARFARIN SODIUM 2.5 MG/1
2 TABLET ORAL DAILY
Qty: 0 | Refills: 0 | Status: COMPLETED | OUTPATIENT
Start: 2017-02-04 | End: 2017-02-06

## 2017-02-04 RX ADMIN — Medication 30 MILLIGRAM(S): at 13:30

## 2017-02-04 RX ADMIN — ALBUTEROL 2.5 MILLIGRAM(S): 90 AEROSOL, METERED ORAL at 02:27

## 2017-02-04 RX ADMIN — Medication 40 MILLIGRAM(S): at 05:20

## 2017-02-04 RX ADMIN — ALBUTEROL 2.5 MILLIGRAM(S): 90 AEROSOL, METERED ORAL at 14:34

## 2017-02-04 RX ADMIN — Medication 500 MICROGRAM(S): at 14:34

## 2017-02-04 RX ADMIN — CEFEPIME 100 MILLIGRAM(S): 1 INJECTION, POWDER, FOR SOLUTION INTRAMUSCULAR; INTRAVENOUS at 21:14

## 2017-02-04 RX ADMIN — Medication 40 MILLIGRAM(S): at 18:14

## 2017-02-04 RX ADMIN — Medication 81 MILLIGRAM(S): at 13:30

## 2017-02-04 RX ADMIN — CEFEPIME 100 MILLIGRAM(S): 1 INJECTION, POWDER, FOR SOLUTION INTRAMUSCULAR; INTRAVENOUS at 11:39

## 2017-02-04 RX ADMIN — WARFARIN SODIUM 2 MILLIGRAM(S): 2.5 TABLET ORAL at 21:14

## 2017-02-04 RX ADMIN — Medication 500 MICROGRAM(S): at 11:41

## 2017-02-04 RX ADMIN — Medication 1 PATCH: at 13:34

## 2017-02-04 RX ADMIN — ALBUTEROL 2.5 MILLIGRAM(S): 90 AEROSOL, METERED ORAL at 20:50

## 2017-02-04 RX ADMIN — Medication 1 TABLET(S): at 08:25

## 2017-02-04 RX ADMIN — Medication 500 MICROGRAM(S): at 20:50

## 2017-02-04 RX ADMIN — Medication 500 MICROGRAM(S): at 02:27

## 2017-02-04 RX ADMIN — ALBUTEROL 2.5 MILLIGRAM(S): 90 AEROSOL, METERED ORAL at 11:13

## 2017-02-04 RX ADMIN — Medication 1 PATCH: at 13:33

## 2017-02-05 LAB
INR BLD: 2.3 RATIO — HIGH (ref 0.88–1.16)
PROTHROM AB SERPL-ACNC: 25.5 SEC — HIGH (ref 10–13.1)

## 2017-02-05 RX ORDER — PANTOPRAZOLE SODIUM 20 MG/1
40 TABLET, DELAYED RELEASE ORAL
Qty: 0 | Refills: 0 | Status: DISCONTINUED | OUTPATIENT
Start: 2017-02-05 | End: 2017-02-08

## 2017-02-05 RX ORDER — CEFUROXIME AXETIL 250 MG
250 TABLET ORAL EVERY 12 HOURS
Qty: 0 | Refills: 0 | Status: DISCONTINUED | OUTPATIENT
Start: 2017-02-05 | End: 2017-02-08

## 2017-02-05 RX ADMIN — Medication 650 MILLIGRAM(S): at 21:09

## 2017-02-05 RX ADMIN — Medication 1 TABLET(S): at 08:57

## 2017-02-05 RX ADMIN — Medication 500 MICROGRAM(S): at 21:04

## 2017-02-05 RX ADMIN — PANTOPRAZOLE SODIUM 40 MILLIGRAM(S): 20 TABLET, DELAYED RELEASE ORAL at 14:25

## 2017-02-05 RX ADMIN — Medication 500 MICROGRAM(S): at 09:19

## 2017-02-05 RX ADMIN — ALBUTEROL 2.5 MILLIGRAM(S): 90 AEROSOL, METERED ORAL at 09:18

## 2017-02-05 RX ADMIN — Medication 40 MILLIGRAM(S): at 05:27

## 2017-02-05 RX ADMIN — Medication 81 MILLIGRAM(S): at 12:33

## 2017-02-05 RX ADMIN — Medication 250 MILLIGRAM(S): at 17:29

## 2017-02-05 RX ADMIN — Medication 40 MILLIGRAM(S): at 14:24

## 2017-02-05 RX ADMIN — CEFEPIME 100 MILLIGRAM(S): 1 INJECTION, POWDER, FOR SOLUTION INTRAMUSCULAR; INTRAVENOUS at 10:19

## 2017-02-05 RX ADMIN — Medication 1 PATCH: at 12:34

## 2017-02-05 RX ADMIN — ALBUTEROL 2.5 MILLIGRAM(S): 90 AEROSOL, METERED ORAL at 04:03

## 2017-02-05 RX ADMIN — Medication 500 MICROGRAM(S): at 14:16

## 2017-02-05 RX ADMIN — WARFARIN SODIUM 2 MILLIGRAM(S): 2.5 TABLET ORAL at 21:07

## 2017-02-05 RX ADMIN — Medication 1 TABLET(S): at 17:29

## 2017-02-05 RX ADMIN — Medication 500 MICROGRAM(S): at 04:03

## 2017-02-05 RX ADMIN — ALBUTEROL 2.5 MILLIGRAM(S): 90 AEROSOL, METERED ORAL at 21:04

## 2017-02-05 RX ADMIN — ERGOCALCIFEROL 2000 UNIT(S): 1.25 CAPSULE ORAL at 14:22

## 2017-02-05 RX ADMIN — ALBUTEROL 2.5 MILLIGRAM(S): 90 AEROSOL, METERED ORAL at 14:16

## 2017-02-05 NOTE — PROVIDER CONTACT NOTE (OTHER) - SITUATION
call placed to dr. Kelley's answering service. made aware of consult.
Dr Kelley aware of consult.
Dr. Rodríguez aware of consult.
Spoke with Jelena from answering service
called Dr Rodríguez regarding consult and spoke to service.

## 2017-02-06 LAB
ANION GAP SERPL CALC-SCNC: 5 MMOL/L — SIGNIFICANT CHANGE UP (ref 5–17)
BUN SERPL-MCNC: 31 MG/DL — HIGH (ref 7–23)
CALCIUM SERPL-MCNC: 9.6 MG/DL — SIGNIFICANT CHANGE UP (ref 8.5–10.1)
CHLORIDE SERPL-SCNC: 95 MMOL/L — LOW (ref 96–108)
CO2 SERPL-SCNC: 38 MMOL/L — HIGH (ref 22–31)
CREAT SERPL-MCNC: 1.29 MG/DL — SIGNIFICANT CHANGE UP (ref 0.5–1.3)
GLUCOSE SERPL-MCNC: 140 MG/DL — HIGH (ref 70–99)
HCT VFR BLD CALC: 34.4 % — LOW (ref 34.5–45)
HGB BLD-MCNC: 10.6 G/DL — LOW (ref 11.5–15.5)
INR BLD: 2.66 RATIO — HIGH (ref 0.88–1.16)
MCHC RBC-ENTMCNC: 30.9 GM/DL — LOW (ref 32–36)
MCHC RBC-ENTMCNC: 31.8 PG — SIGNIFICANT CHANGE UP (ref 27–34)
MCV RBC AUTO: 102.7 FL — HIGH (ref 80–100)
PLATELET # BLD AUTO: 299 K/UL — SIGNIFICANT CHANGE UP (ref 150–400)
POTASSIUM SERPL-MCNC: 4.5 MMOL/L — SIGNIFICANT CHANGE UP (ref 3.5–5.3)
POTASSIUM SERPL-SCNC: 4.5 MMOL/L — SIGNIFICANT CHANGE UP (ref 3.5–5.3)
PROTHROM AB SERPL-ACNC: 29.5 SEC — HIGH (ref 10–13.1)
RBC # BLD: 3.35 M/UL — LOW (ref 3.8–5.2)
RBC # FLD: 21.2 % — HIGH (ref 10.3–14.5)
SODIUM SERPL-SCNC: 138 MMOL/L — SIGNIFICANT CHANGE UP (ref 135–145)
WBC # BLD: 16.6 K/UL — HIGH (ref 3.8–10.5)
WBC # FLD AUTO: 16.6 K/UL — HIGH (ref 3.8–10.5)

## 2017-02-06 RX ADMIN — ALBUTEROL 2.5 MILLIGRAM(S): 90 AEROSOL, METERED ORAL at 09:16

## 2017-02-06 RX ADMIN — PANTOPRAZOLE SODIUM 40 MILLIGRAM(S): 20 TABLET, DELAYED RELEASE ORAL at 11:31

## 2017-02-06 RX ADMIN — Medication 500 MICROGRAM(S): at 03:01

## 2017-02-06 RX ADMIN — Medication 250 MILLIGRAM(S): at 05:26

## 2017-02-06 RX ADMIN — Medication 1 PATCH: at 11:34

## 2017-02-06 RX ADMIN — Medication 40 MILLIGRAM(S): at 05:27

## 2017-02-06 RX ADMIN — Medication 1 PATCH: at 11:35

## 2017-02-06 RX ADMIN — WARFARIN SODIUM 2 MILLIGRAM(S): 2.5 TABLET ORAL at 22:25

## 2017-02-06 RX ADMIN — Medication 500 MICROGRAM(S): at 09:16

## 2017-02-06 RX ADMIN — ERGOCALCIFEROL 2000 UNIT(S): 1.25 CAPSULE ORAL at 13:01

## 2017-02-06 RX ADMIN — Medication 1 TABLET(S): at 11:31

## 2017-02-06 RX ADMIN — Medication 81 MILLIGRAM(S): at 11:34

## 2017-02-06 RX ADMIN — ALBUTEROL 2.5 MILLIGRAM(S): 90 AEROSOL, METERED ORAL at 21:06

## 2017-02-06 RX ADMIN — ALBUTEROL 2.5 MILLIGRAM(S): 90 AEROSOL, METERED ORAL at 03:02

## 2017-02-06 RX ADMIN — Medication 1 TABLET(S): at 17:47

## 2017-02-06 RX ADMIN — Medication 500 MICROGRAM(S): at 16:25

## 2017-02-06 RX ADMIN — Medication 250 MILLIGRAM(S): at 17:47

## 2017-02-07 RX ORDER — NICOTINE POLACRILEX 2 MG
0 GUM BUCCAL
Qty: 0 | Refills: 0 | COMMUNITY
Start: 2017-02-07

## 2017-02-07 RX ORDER — PANTOPRAZOLE SODIUM 20 MG/1
1 TABLET, DELAYED RELEASE ORAL
Qty: 0 | Refills: 0 | COMMUNITY
Start: 2017-02-07

## 2017-02-07 RX ORDER — ACETAMINOPHEN 500 MG
2 TABLET ORAL
Qty: 0 | Refills: 0 | COMMUNITY
Start: 2017-02-07

## 2017-02-07 RX ORDER — IPRATROPIUM BROMIDE 0.2 MG/ML
2.5 SOLUTION, NON-ORAL INHALATION
Qty: 0 | Refills: 0 | COMMUNITY
Start: 2017-02-07

## 2017-02-07 RX ORDER — CEFUROXIME AXETIL 250 MG
1 TABLET ORAL
Qty: 0 | Refills: 0 | COMMUNITY
Start: 2017-02-07

## 2017-02-07 RX ORDER — ALBUTEROL 90 UG/1
0 AEROSOL, METERED ORAL
Qty: 0 | Refills: 0 | COMMUNITY
Start: 2017-02-07

## 2017-02-07 RX ADMIN — Medication 40 MILLIGRAM(S): at 06:19

## 2017-02-07 RX ADMIN — Medication 250 MILLIGRAM(S): at 06:19

## 2017-02-07 RX ADMIN — ALBUTEROL 2.5 MILLIGRAM(S): 90 AEROSOL, METERED ORAL at 19:07

## 2017-02-07 RX ADMIN — Medication 1 TABLET(S): at 10:09

## 2017-02-07 RX ADMIN — Medication 500 MICROGRAM(S): at 13:53

## 2017-02-07 RX ADMIN — Medication 500 MICROGRAM(S): at 13:50

## 2017-02-07 RX ADMIN — Medication 250 MILLIGRAM(S): at 17:24

## 2017-02-07 RX ADMIN — Medication 1 PATCH: at 13:11

## 2017-02-07 RX ADMIN — ALBUTEROL 2.5 MILLIGRAM(S): 90 AEROSOL, METERED ORAL at 02:16

## 2017-02-07 RX ADMIN — ALBUTEROL 2.5 MILLIGRAM(S): 90 AEROSOL, METERED ORAL at 13:49

## 2017-02-07 RX ADMIN — Medication 500 MICROGRAM(S): at 19:06

## 2017-02-07 RX ADMIN — ERGOCALCIFEROL 2000 UNIT(S): 1.25 CAPSULE ORAL at 15:04

## 2017-02-07 RX ADMIN — Medication 1 PATCH: at 13:14

## 2017-02-07 RX ADMIN — Medication 81 MILLIGRAM(S): at 13:11

## 2017-02-07 RX ADMIN — PANTOPRAZOLE SODIUM 40 MILLIGRAM(S): 20 TABLET, DELAYED RELEASE ORAL at 06:19

## 2017-02-07 RX ADMIN — Medication 1 TABLET(S): at 17:26

## 2017-02-07 RX ADMIN — Medication 500 MICROGRAM(S): at 02:17

## 2017-02-07 RX ADMIN — ALBUTEROL 2.5 MILLIGRAM(S): 90 AEROSOL, METERED ORAL at 08:30

## 2017-02-07 NOTE — DISCHARGE NOTE ADULT - CARE PLAN
Principal Discharge DX:	Chronic obstructive pulmonary disease, unspecified COPD type  Goal:	feel better  Instructions for follow-up, activity and diet:	low salt diet, activity as tolarated with Oxygen at all time  Secondary Diagnosis:	Acute congestive heart failure, unspecified congestive heart failure type  Secondary Diagnosis:	Atrial fibrillation, unspecified type  Secondary Diagnosis:	S/P hip replacement, left  Secondary Diagnosis:	Stage 2 chronic kidney disease

## 2017-02-07 NOTE — DISCHARGE NOTE ADULT - PATIENT PORTAL LINK FT
“You can access the [facility] Patient Portal, offered by [facility], by registering with the following website: [email or web address]”

## 2017-02-07 NOTE — PROGRESS NOTE ADULT - SUBJECTIVE AND OBJECTIVE BOX
HPI:  81 year old female with h/o Hypothyroidism s/p chemical ablation , L hip fracture s/p Orif 1/1/2017, COPD, Chronic Respiratory  failure on home O2, Afib on Vka, HFpEF, Ckd stg III, Chonic Anemia presents from [facility] with hypoxia. Patient has been c/o malaise and coughing for the past few days. She was noted to have profound hypoxia and was sent to the ED  -found to have AECopd and Influenza A (30 Jan 2017 11:26)      PMHx: PAST MEDICAL & SURGICAL HISTORY:  Anemia, unspecified type  Stage 2 chronic kidney disease  Acute congestive heart failure, unspecified congestive heart failure type  Hypothyroidism, unspecified type  Chronic obstructive pulmonary disease, unspecified COPD type  Atrial fibrillation, unspecified type  H/O prior ablation treatment: chemical  S/P hip replacement, left: previous ORIF to left hip about 2wks prior to admission  No significant past surgical history        Soc Hx:  lives in [place]      Allergies: Allergies    No Known Allergies        REVIEW OF SYSTEMS:    CONSTITUTIONAL: No weakness, fevers or chills  EYES/ENT: No visual changes;  No vertigo or throat pain   NECK: No pain or stiffness  RESPIRATORY: No cough, wheezing, hemoptysis; No shortness of breath  CARDIOVASCULAR: No chest pain or palpitations  GASTROINTESTINAL: No abdominal or epigastric pain. No nausea, vomiting, or hematemesis; No diarrhea or constipation. No melena or hematochezia.  GENITOURINARY: No dysuria, frequency or hematuria  NEUROLOGICAL: No numbness or weakness  SKIN: No itching, burning, rashes, or lesions   All other review of systems is negative unless indicated above            Basic Metabolic Panel (02.01.17 @ 04:45)    Sodium, Serum: 142 mmol/L    Potassium, Serum: 4.4 mmol/L    Chloride, Serum: 106 mmol/L    Carbon Dioxide, Serum: 30 mmol/L    Anion Gap, Serum: 6 mmol/L    Blood Urea Nitrogen, Serum: 26 mg/dL    Creatinine, Serum: 1.45 mg/dL    Glucose, Serum: 136 mg/dL    Calcium, Total Serum: 8.2 mg/dL    eGFR if Non : 34: Interpretative comment  The units for eGFR are ml/min/1.73m2 (normalized body surface area). The  eGFR is calculated from a serum creatinine using the CKD-EPI equation.  Other variables required for calculation are race, age and sex. Among  patients w34: ith chronic kidney disease (CKD), the eGFR is useful in  determining the stage of disease according to KDOQI CKD classification.  All eGFR results are reported numerically with the following  interpretation.          GFR                    With34:                  Without     (ml/min/1.73 m2)    Kidney Damage       Kidney Damage        >= 90                    Stage 1                     Normal        60-89                    Stage 2                     Decreased GFR        :      Stage 3                     Stage 3        15-29                    Stage 4                     Stage 4        < 15                      Stage 5                     Stage 5  Each stage of CKD assumes that the associated GFR level has been in  eff34: ect for at least 3 months. Determination of stages one and two (with  eGFR > 59 ml/min/m2) requires estimation of kidney damage for at least 3  months as defined by structural or functional abnormalities.  Limitations: All estimates of GFR will be les34: s accurate for patients at  extremes of muscle mass (including but not limited to frail elderly,  critically ill, or cancer patients), those with unusual diets, and those  with conditions associated with reduced secretion or extrarenal  elimination of34:  creatinine. The eGFR equation is not recommended for use  in patients with unstable creatinine levels. mL/min/1.73M2    eGFR if African American: 39 mL/min/1.73M2      Basic Metabolic Panel (02.01.17 @ 04:45)    Sodium, Serum: 142 mmol/L    Potassium, Serum: 4.4 mmol/L    Chloride, Serum: 106 mmol/L    Carbon Dioxide, Serum: 30 mmol/L    Anion Gap, Serum: 6 mmol/L    Blood Urea Nitrogen, Serum: 26 mg/dL    Creatinine, Serum: 1.45 mg/dL    Glucose, Serum: 136 mg/dL    Calcium, Total Serum: 8.2 mg/dL    eGFR if Non : 34: Interpretative comment  The units for eGFR are ml/min/1.73m2 (normalized body surface area). The  eGFR is calculated from a serum creatinine using the CKD-EPI equation.  Other variables required for calculation are race, age and sex. Among  patients w34: ith chronic kidney disease (CKD), the eGFR is useful in  determining the stage of disease according to KDOQI CKD classification.  All eGFR results are reported numerically with the following  interpretation.          GFR                    With34:                  Without     (ml/min/1.73 m2)    Kidney Damage       Kidney Damage        >= 90                    Stage 1                     Normal        60-89                    Stage 2                     Decreased GFR        :      Stage 3                     Stage 3        15-29                    Stage 4                     Stage 4        < 15                      Stage 5                     Stage 5  Each stage of CKD assumes that the associated GFR level has been in  eff34: ect for at least 3 months. Determination of stages one and two (with  eGFR > 59 ml/min/m2) requires estimation of kidney damage for at least 3  months as defined by structural or functional abnormalities.  Limitations: All estimates of GFR will be les34: s accurate for patients at  extremes of muscle mass (including but not limited to frail elderly,  critically ill, or cancer patients), those with unusual diets, and those  with conditions associated with reduced secretion or extrarenal  elimination of34:  creatinine. The eGFR equation is not recommended for use  in patients with unstable creatinine levels. mL/min/1.73M2    eGFR if African American: 39 mL/min/1.73M2                          10.0   x     )-----------( x        ( 31 Jan 2017 05:50 )             32.6
KEYONNA DLE ROSARIO  MRN: [id number]    S: 2/4/17 - Chart reviewed. Denies dyspnea. Tired. Comfortable at rest.     PAST MEDICAL & SURGICAL HISTORY:  Anemia, unspecified type  Stage 2 chronic kidney disease  Acute congestive heart failure, unspecified congestive heart failure type  Hypothyroidism, unspecified type  Chronic obstructive pulmonary disease, unspecified COPD type  Atrial fibrillation, unspecified type  H/O prior ablation treatment: chemical  S/P hip replacement, left: previous ORIF to left hip about 2wks prior to admission  No significant past surgical history      O: T(C): 37.1, Max: 37.1 (02-03 @ 23:55)  HR: 70 (70 - 93)  BP: 91/33 (91/33 - 139/60)  RR: 23 (12 - 23)  SpO2: 100% (54% - 100%)  Wt(kg): --    PHYSICAL EXAM:      GENERAL: Comfortable    NEURO: awake; alert. Hard of hearing.     NECK: No JVD    CHEST: occ. rhonchi/wheeze with good air entry    CARDIAC: irreg    EXT: no edema    ABG - ( 03 Feb 2017 13:43 )  pH: 7.35  /  pCO2: 58    /  pO2: 68    / HCO3: 31    / Base Excess: 5     /  SaO2: 92          PROCEDURE DATE:  02/03/2017        INTERPRETATION:  History: Dyspnea    Chest:  one view.      Comparison: 1/30/2017    AP radiograph of the chest demonstrates mild vascular congestion with   small BILATERAL pleural effusions and LEFT lower lobe infiltrate. The   cardiac silhouette is normal in size. Osseous structures are intact.    Impression:mild vascular congestion with small BILATERAL pleural   effusions and LEFT lower lobe infiltrate.                                 10.3   10.2  )-----------( 308      ( 03 Feb 2017 05:18 )             34.0       04 Feb 2017 06:26    139    |  97     |  28     ----------------------------<  138    4.3     |  37     |  1.30     Ca    9.3        04 Feb 2017 06:26        MEDICATIONS  (STANDING):  ergocalciferol Drops 2000Unit(s) Oral daily  diltiazem    Tablet 60milliGRAM(s) Oral every 8 hours  cefepime  IVPB  IV Intermittent   ALBUTerol    0.083% 2.5milliGRAM(s) Nebulizer every 6 hours  ipratropium    for Nebulization 500MICROGram(s) Inhalation every 6 hours  cefepime  IVPB 500milliGRAM(s) IV Intermittent every 12 hours  aspirin  chewable 81milliGRAM(s) Oral daily  oseltamivir 30milliGRAM(s) Oral daily  calcium carbonate 1250 mG (OsCal) 1Tablet(s) Oral two times a day  nicotine -  14 mG/24Hr(s) Patch 1patch Transdermal daily  methylPREDNISolone sodium succinate Injectable 40milliGRAM(s) IV Push every 12 hours    MEDICATIONS  (PRN):  acetaminophen   Tablet 650milliGRAM(s) Oral every 6 hours PRN pain or fever        A/P: 1.- Respiratory failure.     2. - Influenza infection with suspected superimposed pneumonia      3. - Acute exacerbation of COPD     4. - Hx of Pulmonary fibrosis/COPD     5. - Status post recent hip repair     6. - Diastolic heart failure/atrial fib - per cardiology.     PLAN: Continue steroids / bronchodilators / antibiotics - O2 supplement. BiPAP prn and qhs. Improved today. Will continue present treatment.
81 year old female with h/o Hypothyroidism s/p chemical ablation , L hip fracture s/p Orif 1/1/2017, COPD, Chronic Respiratory  failure on home O2, Afib on Vka, HFpEF, Ckd stg III, Chonic Anemia presents from [facility] with hypoxia. Patient has been c/o malaise and coughing for the past few days. She was noted to have profound hypoxia and was sent to the ED  -found to have AECopd and Influenza A    1/31: feels better, no cp, no sob, no n/v/d  2/1  : still hypoxic, requiring NRB, no coughing, no distress  2.2:  hypoxia has improved, +dyspnea                                               REVIEW OF SYSTEMS:    CONSTITUTIONAL: No weakness, No fevers or chills  ENT: No ear ache, No sorethroat  NECK: No pain, No stiffness  RESPIRATORY: + cough, No wheezing, No hemoptysis; + dyspnea  CARDIOVASCULAR: No chest pain, No palpitations  GASTROINTESTINAL: No abd pain, No nausea, No vomiting, No hematemesis, No diarrhea or constipation. No melena, No hematochezia.  GENITOURINARY: No dysuria, No  hematuria  NEUROLOGICAL: No diplopia, No paresthesia, No motor dysfunction  SKIN: No rashes, or lesions   PSYCH: no anxiety, no suicidal ideation    All other review of systems is negative unless indicated above    Vital Signs Last 24 Hrs  T(C): 36.8, Max: 36.8 (02-02 @ 01:00)  T(F): 98.2, Max: 98.3 (02-02 @ 01:00)  HR: 94 (66 - 95)  BP: 105/53 (83/54 - 118/88)  BP(mean): 65 (58 - 95)  RR: 23 (12 - 24)  SpO2: 90% (79% - 100%)    PHYSICAL EXAM:    GENERAL: NAD, Well nourished  HEENT:  NC/AT, EOMI, PERRLA, No scleral icterus, Moist mucous membranes  NECK: Supple, No JVD  CNS:  Alert & Oriented X3, Motor Strength 5/5 B/L upper and lower extremities; DTRs 2+ intact   LUNG: Decreased Breath sounds, Clear to auscultation bilaterally, No rales, No rhonchi, No wheezing  HEART: RRR; No murmurs, No rubs  ABDOMEN: +BS, ST/ND/NT  GENITOURINARY- Voiding, Bladder not distended  EXTREMITIES:  2+ Peripheral Pulses, No clubbing, No cyanosis, No tibial edema  MUSCULOSKELTAL: Joints normal ROM, No joint tenderness, No muscle tenderness  VAGINAL: deferred  RECTAL: deferred, not indicated  BREAST: deferred      01 Feb 2017 04:45    142    |  106    |  26     ----------------------------<  136    4.4     |  30     |  1.45     Ca    8.2        01 Feb 2017 04:45      Vancomycin levels:   Cultures:     MEDICATIONS  (STANDING):  ergocalciferol Drops 2000Unit(s) Oral daily  diltiazem    Tablet 60milliGRAM(s) Oral every 8 hours  cefepime  IVPB  IV Intermittent   ALBUTerol    0.083% 2.5milliGRAM(s) Nebulizer every 6 hours  sodium chloride 0.9%. 1000milliLiter(s) IV Continuous <Continuous>  ipratropium    for Nebulization 500MICROGram(s) Inhalation every 6 hours  cefepime  IVPB 500milliGRAM(s) IV Intermittent every 12 hours  aspirin  chewable 81milliGRAM(s) Oral daily  oseltamivir 30milliGRAM(s) Oral daily  calcium carbonate 1250 mG (OsCal) 1Tablet(s) Oral two times a day  methylPREDNISolone sodium succinate Injectable 40milliGRAM(s) IV Push three times a day  warfarin 2milliGRAM(s) Oral <User Schedule>  sodium chloride 0.9%. 500milliLiter(s) IV Continuous <Continuous>    MEDICATIONS  (PRN):  acetaminophen   Tablet 650milliGRAM(s) Oral every 6 hours PRN pain or fever        A/P:    1. Acute on chronic Resp failure due to AECopd and Influenza  slightly improved but still hypoxic  cw supplemental O2 and BIPAP  Unlikely PE with an INR>2    2. AECopd:  Solumedrol 40mg IV q8hrs and taper as tolarated  Albuterol/Atrovent nebs  Bipap mask as needed  Pulmonary evaluation noted  Cefepime 0.5gm q12hrs for probable underlying bacterial resp infection    3. Influenza A  Tamiflu 75mg po BID day#4/5    4. ARF superimposed on Ckd stg III: improving, likely due to prerenal azotemia: improved, Cr at baseline  d/c IV hydration    5. AOCD: stable Hb    6. Atrial fibrillation:   rate controlled, cw Cardizem 60mg po q8hrs  INR target: 2-3, restart Coumadin for INR<3
81 year old female with h/o Hypothyroidism s/p chemical ablation , L hip fracture s/p Orif 1/1/2017, COPD, Chronic Respiratory  failure on home O2, Afib on Vka, HFpEF, Ckd stg III, Chonic Anemia presents from [facility] with hypoxia. Patient has been c/o malaise and coughing for the past few days. She was noted to have profound hypoxia and was sent to the ED  -found to have AECopd and Influenza A    1/31: feels better, no cp, no sob, no n/v/d  2/1  : still hypoxic, requiring NRB, no coughing, no distress  2.2:  hypoxia has improved, +dyspnea  2.3:  very hypoxic today requiring 100%NRB again, no cp, no n/v/d  2.4: less hypoxic and more comfortable, no cp, no n/v/d              REVIEW OF SYSTEMS:    CONSTITUTIONAL: ++ weakness, No fevers or chills  ENT: No ear ache, No sorethroat  NECK: No pain, No stiffness  RESPIRATORY: No cough, No wheezing, No hemoptysis; + dyspnea  CARDIOVASCULAR: No chest pain, No palpitations  GASTROINTESTINAL: No abd pain, No nausea, No vomiting, No hematemesis, No diarrhea or constipation. No melena, No hematochezia.  GENITOURINARY: No dysuria, No  hematuria  NEUROLOGICAL: No diplopia, No paresthesia, No motor dysfunction  SKIN: No rashes, or lesions   PSYCH: no anxiety, no suicidal ideation    All other review of systems is negative unless indicated above    Vital Signs Last 24 Hrs  T(C): 36.6, Max: 37.1 (02-03 @ 23:55)  T(F): 97.9, Max: 98.7 (02-03 @ 23:55)  HR: 80 (70 - 93)  BP: 117/50 (89/40 - 139/60)  BP(mean): 65 (45 - 80)  RR: 14 (12 - 26)  SpO2: 89% (73% - 100%)    PHYSICAL EXAM:    GENERAL: NAD, Well nourished  HEENT:  NC/AT, EOMI, PERRLA, No scleral icterus, Moist mucous membranes  NECK: Supple, No JVD  CNS:  Alert & Oriented X3, Motor Strength 5/5 B/L upper and lower extremities; DTRs 2+ intact   LUNG: decreased bilat Breath sounds, Clear to auscultation bilaterally, No rales, No rhonchi, No wheezing  HEART: RRR; No murmurs, No rubs  ABDOMEN: +BS, ST/ND/NT  GENITOURINARY- Voiding, Bladder not distended  EXTREMITIES:  2+ Peripheral Pulses, No clubbing, No cyanosis, No tibial edema  MUSCULOSKELTAL: Joints normal ROM, No joint tenderness, No muscle tenderness  VAGINAL: deferred  RECTAL: deferred, not indicated  BREAST: deferred                          10.3   10.2  )-----------( 308      ( 03 Feb 2017 05:18 )             34.0     04 Feb 2017 06:26    139    |  97     |  28     ----------------------------<  138    4.3     |  37     |  1.30     Ca    9.3        04 Feb 2017 06:26          MEDICATIONS  (STANDING):  ergocalciferol Drops 2000Unit(s) Oral daily  diltiazem    Tablet 60milliGRAM(s) Oral every 8 hours  cefepime  IVPB  IV Intermittent   ALBUTerol    0.083% 2.5milliGRAM(s) Nebulizer every 6 hours  ipratropium    for Nebulization 500MICROGram(s) Inhalation every 6 hours  cefepime  IVPB 500milliGRAM(s) IV Intermittent every 12 hours  aspirin  chewable 81milliGRAM(s) Oral daily  oseltamivir 30milliGRAM(s) Oral daily  calcium carbonate 1250 mG (OsCal) 1Tablet(s) Oral two times a day  nicotine -  14 mG/24Hr(s) Patch 1patch Transdermal daily  methylPREDNISolone sodium succinate Injectable 40milliGRAM(s) IV Push every 12 hours    MEDICATIONS  (PRN):  acetaminophen   Tablet 650milliGRAM(s) Oral every 6 hours PRN pain or fever                                                         A/P:    1. Acute on chronic Resp failure due to AECopd/Influenza and acute diastolic Chf:  -patient has gotten worse, suspect fluid overload....s/p IV Lasix 40mg with improvement; will c/w diurrhesis today  cw supplemental O2 and BIPAP as needed  Unlikely PE with an INR>2    2. AECopd:  Solumedrol 40mg IV q8hrs and taper as tolarated  Albuterol/Atrovent nebs  Bipap mask as needed  Pulmonary evaluation noted  Cefepime 0.5gm q12hrs for probable underlying bacterial resp infection day#5    3. Influenza A  Tamiflu 75mg po BID day#5/5    4. ARF superimposed on Ckd stg III: improved, likely due to prerenal azotemia: improved, Cr at baseline  d/c IV hydration due to likely pulmonary fluid overload    5. AOCD: stable Hb    6. Atrial fibrillation:   rate controlled, cw Cardizem 60mg po q8hrs  INR target: 2-3, restart Coumadin for INR<3    7. Acute on chronic diastolic Chf:  IV Lasix
81 year old female with h/o Hypothyroidism s/p chemical ablation , L hip fracture s/p Orif 1/1/2017, COPD, Chronic Respiratory  failure on home O2, Afib on Vka, HFpEF, Ckd stg III, Chonic Anemia presents from [facility] with hypoxia. Patient has been c/o malaise and coughing for the past few days. She was noted to have profound hypoxia and was sent to the ED  -found to have AECopd and Influenza A    1/31: feels better, no cp, no sob, no n/v/d  2/1  : still hypoxic, requiring NRB, no coughing, no distress  2.2:  hypoxia has improved, +dyspnea  2.3:  very hypoxic today requiring 100%NRB again, no cp, no n/v/d  2.4: less hypoxic and more comfortable, no cp, no n/v/d  2.5: doing better today, no cp, no n/v/d, dyspnea improved              REVIEW OF SYSTEMS:    CONSTITUTIONAL: ++ weakness, No fevers or chills  ENT: No ear ache, No sorethroat  NECK: No pain, No stiffness  RESPIRATORY: No cough, No wheezing, No hemoptysis; + dyspnea  CARDIOVASCULAR: No chest pain, No palpitations  GASTROINTESTINAL: No abd pain, No nausea, No vomiting, No hematemesis, No diarrhea or constipation. No melena, No hematochezia.  GENITOURINARY: No dysuria, No  hematuria  NEUROLOGICAL: No diplopia, No paresthesia, No motor dysfunction  SKIN: No rashes, or lesions   PSYCH: no anxiety, no suicidal ideation    All other review of systems is negative unless indicated above    Vital Signs Last 24 Hrs  T(C): 36.5, Max: 36.9 (02-05 @ 05:00)  T(F): 97.7, Max: 98.4 (02-05 @ 05:00)  HR: 65 (65 - 89)  BP: 92/46 (85/31 - 117/50)  BP(mean): 57 (44 - 65)  RR: 13 (13 - 22)  SpO2: 100% (73% - 100%)    PHYSICAL EXAM:    GENERAL: NAD, Well nourished  HEENT:  NC/AT, EOMI, PERRLA, No scleral icterus, Moist mucous membranes  NECK: Supple, No JVD  CNS:  Alert & Oriented X3, Motor Strength 5/5 B/L upper and lower extremities; DTRs 2+ intact   LUNG: decreased bilat Breath sounds, Clear to auscultation bilaterally, No rales, No rhonchi, No wheezing  HEART: RRR; No murmurs, No rubs  ABDOMEN: +BS, ST/ND/NT  GENITOURINARY- Voiding, Bladder not distended  EXTREMITIES:  2+ Peripheral Pulses, No clubbing, No cyanosis, No tibial edema  MUSCULOSKELTAL: Joints normal ROM, No joint tenderness, No muscle tenderness  VAGINAL: deferred  RECTAL: deferred, not indicated  BREAST: deferred                04 Feb 2017 06:26    139    |  97     |  28     ----------------------------<  138    4.3     |  37     |  1.30     Ca    9.3        04 Feb 2017 06:26              MEDICATIONS  (STANDING):  ergocalciferol Drops 2000Unit(s) Oral daily  diltiazem    Tablet 60milliGRAM(s) Oral every 8 hours  cefepime  IVPB  IV Intermittent   ALBUTerol    0.083% 2.5milliGRAM(s) Nebulizer every 6 hours  ipratropium    for Nebulization 500MICROGram(s) Inhalation every 6 hours  cefepime  IVPB 500milliGRAM(s) IV Intermittent every 12 hours  aspirin  chewable 81milliGRAM(s) Oral daily  oseltamivir 30milliGRAM(s) Oral daily  calcium carbonate 1250 mG (OsCal) 1Tablet(s) Oral two times a day  nicotine -  14 mG/24Hr(s) Patch 1patch Transdermal daily  methylPREDNISolone sodium succinate Injectable 40milliGRAM(s) IV Push every 12 hours    MEDICATIONS  (PRN):  acetaminophen   Tablet 650milliGRAM(s) Oral every 6 hours PRN pain or fever                                                         A/P:    1. Acute on chronic Resp failure due to AECopd/Influenza and acute diastolic Chf:  -patient has gotten worse due to  fluid overload....s/p IV Lasix 40mg with improvement; cw oral diurrhetics,   cw supplemental O2 and BIPAP as needed  Unlikely PE with an INR>2    2. AECopd:  change steroids to prednisone  Albuterol/Atrovent nebs  Bipap mask as needed  Pulmonary evaluation noted  Cefepime 0.5gm q12hrs for probable underlying bacterial resp infection day#6, will change to Ceftin    3. Influenza A  Tamiflu 75mg po BID day#5/5    4. ARF superimposed on Ckd stg III: improved, likely due to prerenal azotemia: improved, Cr at baseline  d/c IV hydration due to likely pulmonary fluid overload    5. AOCD: stable Hb    6. Atrial fibrillation:   rate controlled, cw Cardizem 60mg po q8hrs  INR target: 2-3, restart Coumadin for INR<3    7. Acute on chronic diastolic Chf:  resolved  cw oral Lasix
81 year old female with h/o Hypothyroidism s/p chemical ablation , L hip fracture s/p Orif 1/1/2017, COPD, Chronic Respiratory  failure on home O2, Afib on Vka, HFpEF, Ckd stg III, Chonic Anemia presents from [facility] with hypoxia. Patient has been c/o malaise and coughing for the past few days. She was noted to have profound hypoxia and was sent to the ED  -found to have AECopd and Influenza A    1/31: feels better, no cp, no sob, no n/v/d  2/1  : still hypoxic, requiring NRB, no coughing, no distress  2.2:  hypoxia has improved, +dyspnea  2.3:  very hypoxic today requiring 100%NRB again, no cp, no n/v/d  2.4: less hypoxic and more comfortable, no cp, no n/v/d  2.5: doing better today, no cp, no n/v/d, dyspnea improved  2.6: much better today, sitting in the chair, ambulated with PT, no distress              REVIEW OF SYSTEMS:    CONSTITUTIONAL: ++ weakness, No fevers or chills  ENT: No ear ache, No sorethroat  NECK: No pain, No stiffness  RESPIRATORY: No cough, No wheezing, No hemoptysis; + dyspnea  CARDIOVASCULAR: No chest pain, No palpitations  GASTROINTESTINAL: No abd pain, No nausea, No vomiting, No hematemesis, No diarrhea or constipation. No melena, No hematochezia.  GENITOURINARY: No dysuria, No  hematuria  NEUROLOGICAL: No diplopia, No paresthesia, No motor dysfunction  SKIN: No rashes, or lesions   PSYCH: no anxiety, no suicidal ideation    All other review of systems is negative unless indicated above  Vital Signs Last 24 Hrs  T(C): 36.8, Max: 36.8 (02-05 @ 20:54)  T(F): 98.2, Max: 98.2 (02-05 @ 20:54)  HR: 86 (75 - 90)  BP: 100/49 (99/49 - 134/122)  BP(mean): 60 (58 - 125)  RR: 20 (20 - 26)  SpO2: 88% (88% - 100%)    PHYSICAL EXAM:    GENERAL: NAD, Well nourished  HEENT:  NC/AT, EOMI, PERRLA, No scleral icterus, Moist mucous membranes  NECK: Supple, No JVD  CNS:  Alert & Oriented X3, Motor Strength 5/5 B/L upper and lower extremities; DTRs 2+ intact   LUNG: decreased bilat Breath sounds, Clear to auscultation bilaterally, No rales, No rhonchi, No wheezing  HEART: RRR; No murmurs, No rubs  ABDOMEN: +BS, ST/ND/NT  GENITOURINARY- Voiding, Bladder not distended  EXTREMITIES:  2+ Peripheral Pulses, No clubbing, No cyanosis, No tibial edema  MUSCULOSKELTAL: Joints normal ROM, No joint tenderness, No muscle tenderness  VAGINAL: deferred  RECTAL: deferred, not indicated  BREAST: deferred                          10.6   16.6  )-----------( 299      ( 06 Feb 2017 06:18 )             34.4   06 Feb 2017 06:18    138    |  95     |  31     ----------------------------<  140    4.5     |  38     |  1.29     Ca    9.6        06 Feb 2017 06:18                MEDICATIONS  (STANDING):  ergocalciferol Drops 2000Unit(s) Oral daily  diltiazem    Tablet 60milliGRAM(s) Oral every 8 hours  cefepime  IVPB  IV Intermittent   ALBUTerol    0.083% 2.5milliGRAM(s) Nebulizer every 6 hours  ipratropium    for Nebulization 500MICROGram(s) Inhalation every 6 hours  cefepime  IVPB 500milliGRAM(s) IV Intermittent every 12 hours  aspirin  chewable 81milliGRAM(s) Oral daily  oseltamivir 30milliGRAM(s) Oral daily  calcium carbonate 1250 mG (OsCal) 1Tablet(s) Oral two times a day  nicotine -  14 mG/24Hr(s) Patch 1patch Transdermal daily  methylPREDNISolone sodium succinate Injectable 40milliGRAM(s) IV Push every 12 hours    MEDICATIONS  (PRN):  acetaminophen   Tablet 650milliGRAM(s) Oral every 6 hours PRN pain or fever                                                         A/P:    1. Acute on chronic Resp failure due to AECopd/Influenza and acute diastolic Chf:  -patient has gotten worse due to  fluid overload....s/p IV Lasix 40mg with improvement; cw oral diurrhetics,   cw supplemental O2 and BIPAP as needed  Unlikely PE with an INR>2    2. AECopd:  change steroids to prednisone  Albuterol/Atrovent nebs  Bipap mask as needed  Pulmonary evaluation noted  Cefepime 0.5gm q12hrs for probable underlying bacterial resp infection day#6, will change to Ceftin    3. Influenza A  Tamiflu 75mg po BID day#5/5    4. ARF superimposed on Ckd stg III: improved, likely due to prerenal azotemia: improved, Cr at baseline  d/c IV hydration due to likely pulmonary fluid overload    5. AOCD: stable Hb    6. Atrial fibrillation:   rate controlled, cw Cardizem 60mg po q8hrs  INR target: 2-3, restart Coumadin for INR<3    7. Acute on chronic diastolic Chf:  resolved  cw oral Lasix    Transfer to med surg
81 year old female with h/o Hypothyroidism s/p chemical ablation , L hip fracture s/p Orif 1/1/2017, COPD, Chronic Respiratory  failure on home O2, Afib on Vka, HFpEF, Ckd stg III, Chonic Anemia presents from [facility] with hypoxia. Patient has been c/o malaise and coughing for the past few days. She was noted to have profound hypoxia and was sent to the ED  -found to have AECopd and Influenza A    1/31: feels better, no cp, no sob, no n/v/d      REVIEW OF SYSTEMS:    CONSTITUTIONAL: No weakness, fevers or chills  EYES/ENT: No visual changes;  No vertigo or throat pain   NECK: No pain or stiffness  RESPIRATORY: No cough, wheezing, hemoptysis; No shortness of breath  CARDIOVASCULAR: No chest pain or palpitations  GASTROINTESTINAL: B/L lower quadrant pain. No nausea, vomiting, or hematemesis; No diarrhea or constipation. No melena or hematochezia.  GENITOURINARY: No dysuria, frequency or hematuria  NEUROLOGICAL: No numbness or weakness  SKIN: No itching, burning, rashes, or lesions   All other review of systems is negative unless indicated above    PHYSICAL EXAM:    GENERAL: NAD, well-groomed, well-developed  HEAD:  NC/AT  EYES: EOMI, PERRLA, no scleral icterus  HEENT: Moist mucous membranes  NECK: Supple, No JVD  CNS:  Alert & Oriented X3, Motor Strength 5/5 B/L upper and lower extremities; DTRs 2+ intact   LUNG: decreased BS bilaterally; mild wheezing noted, no crackles  HEART: RRR; No murmurs, rubs, or gallops  ABDOMEN: +BS, ST/ND/NT  GENITOURINARY- Voiding, Bladder not distended  EXTREMITIES:  2+ Peripheral Pulses, No clubbing, cyanosis, or edema  MUSCULOSKELTAL- Joints normal ROM, no Muscle or joint tenderness  VAGINAL: deferred  RECTAL: deferred, not indicated  BREAST: deferred\                          10.0   x     )-----------( x        ( 31 Jan 2017 05:50 )    31 Jan 2017 05:50    141    |  102    |  27     ----------------------------<  133    4.2     |  30     |  1.57     Ca    8.0        31 Jan 2017 05:50    TPro  7.3    /  Alb  2.8    /  TBili  0.7    /  DBili  x      /  AST  15     /  ALT  17     /  AlkPhos  97     30 Jan 2017 00:48               32.6
81 year old female with h/o Hypothyroidism s/p chemical ablation , L hip fracture s/p Orif 1/1/2017, COPD, Chronic Respiratory  failure on home O2, Afib on Vka, HFpEF, Ckd stg III, Chonic Anemia presents from [facility] with hypoxia. Patient has been c/o malaise and coughing for the past few days. She was noted to have profound hypoxia and was sent to the ED  -found to have AECopd and Influenza A    1/31: feels better, no cp, no sob, no n/v/d  2/1  : still hypoxic, requiring NRB, no coughing, no distress      REVIEW OF SYSTEMS:    CONSTITUTIONAL: No weakness, fevers or chills  EYES/ENT: No visual changes;  No vertigo or throat pain   NECK: No pain or stiffness  RESPIRATORY: +dyspnea  CARDIOVASCULAR: No chest pain or palpitations  GASTROINTESTINAL: B/L lower quadrant pain. No nausea, vomiting, or hematemesis; No diarrhea or constipation. No melena or hematochezia.  GENITOURINARY: No dysuria, frequency or hematuria  NEUROLOGICAL: No numbness or weakness  SKIN: No itching, burning, rashes, or lesions   All other review of systems is negative unless indicated above    PHYSICAL EXAM:    GENERAL: NAD, well-groomed, well-developed  HEAD:  NC/AT  EYES: EOMI, PERRLA, no scleral icterus  HEENT: Moist mucous membranes  NECK: Supple, No JVD  CNS:  Alert & Oriented X3, Motor Strength 5/5 B/L upper and lower extremities; DTRs 2+ intact   LUNG: decreased BS bilaterally; mild wheezing noted, no crackles  HEART: RRR; No murmurs, rubs, or gallops  ABDOMEN: +BS, ST/ND/NT  GENITOURINARY- Voiding, Bladder not distended  EXTREMITIES:  2+ Peripheral Pulses, No clubbing, cyanosis, or edema  MUSCULOSKELTAL- Joints normal ROM, no Muscle or joint tenderness  VAGINAL: deferred  RECTAL: deferred, not indicated  BREAST: deferred\                                       10.0   x     )-----------( x        ( 31 Jan 2017 05:50 )             32.6   01 Feb 2017 04:45    142    |  106    |  26     ----------------------------<  136    4.4     |  30     |  1.45     Ca    8.2        01 Feb 2017 04:45
81 year old female with h/o Hypothyroidism s/p chemical ablation , L hip fracture s/p Orif 1/1/2017, COPD, Chronic Respiratory  failure on home O2, Afib on Vka, HFpEF, Ckd stg III, Chonic Anemia presents from [facility] with hypoxia. Patient has been c/o malaise and coughing for the past few days. She was noted to have profound hypoxia and was sent to the ED  -found to have AECopd and Influenza A    1/31: feels better, no cp, no sob, no n/v/d  2/1  : still hypoxic, requiring NRB, no coughing, no distress  2.2:  hypoxia has improved, +dyspnea  2.3:  very hypoxic today requiring 100%NRB again, no cp, no n/v/d            REVIEW OF SYSTEMS:    CONSTITUTIONAL: No weakness, No fevers or chills  ENT: No ear ache, No sorethroat  NECK: No pain, No stiffness  RESPIRATORY: No cough, No wheezing, No hemoptysis; No dyspnea  CARDIOVASCULAR: No chest pain, No palpitations  GASTROINTESTINAL: No abd pain, No nausea, No vomiting, No hematemesis, No diarrhea or constipation. No melena, No hematochezia.  GENITOURINARY: No dysuria, No  hematuria  NEUROLOGICAL: No diplopia, No paresthesia, No motor dysfunction  SKIN: No rashes, or lesions   PSYCH: no anxiety, no suicidal ideation    All other review of systems is negative unless indicated above    Vital Signs Last 24 Hrs  T(C): 37.2, Max: 37.2 (02-03 @ 07:00)  T(F): 98.9, Max: 98.9 (02-03 @ 07:00)  HR: 80 (66 - 98)  BP: 130/108 (96/46 - 130/108)  BP(mean): 113 (56 - 113)  RR: 12 (12 - 23)  SpO2: 92% (78% - 100%)    PHYSICAL EXAM:    GENERAL: +dyspnea, Well nourished  HEENT:  NC/AT, EOMI, PERRLA, No scleral icterus, Moist mucous membranes  NECK: Supple, No JVD  CNS:  Alert & Oriented X3, Motor Strength 5/5 B/L upper and lower extremities; DTRs 2+ intact   LUNG: decreased BS bilaterally; no wheezing, bibasilar crackles bilaterally  HEART: RRR; No murmurs, No rubs  ABDOMEN: +BS, ST/ND/NT  GENITOURINARY- Voiding, Bladder not distended  EXTREMITIES:  2+ Peripheral Pulses, No clubbing, No cyanosis, No tibial edema  MUSCULOSKELTAL: Joints normal ROM, No joint tenderness, No muscle tenderness  VAGINAL: deferred  RECTAL: deferred, not indicated  BREAST: deferred                          10.3   10.2  )-----------( 308      ( 03 Feb 2017 05:18 )             34.0     03 Feb 2017 05:18    141    |  104    |  25     ----------------------------<  154    4.4     |  31     |  1.39     Ca    8.8        03 Feb 2017 05:18        MEDICATIONS  (STANDING):  ergocalciferol Drops 2000Unit(s) Oral daily  diltiazem    Tablet 60milliGRAM(s) Oral every 8 hours  cefepime  IVPB  IV Intermittent   ALBUTerol    0.083% 2.5milliGRAM(s) Nebulizer every 6 hours  ipratropium    for Nebulization 500MICROGram(s) Inhalation every 6 hours  cefepime  IVPB 500milliGRAM(s) IV Intermittent every 12 hours  aspirin  chewable 81milliGRAM(s) Oral daily  oseltamivir 30milliGRAM(s) Oral daily  calcium carbonate 1250 mG (OsCal) 1Tablet(s) Oral two times a day  methylPREDNISolone sodium succinate Injectable 40milliGRAM(s) IV Push three times a day  nicotine -  14 mG/24Hr(s) Patch 1patch Transdermal daily    MEDICATIONS  (PRN):  acetaminophen   Tablet 650milliGRAM(s) Oral every 6 hours PRN pain or fever                                                       A/P:    1. Acute on chronic Resp failure due to AECopd and Influenza  -patient has gotten worse, suspect fluid overload....will give IV Lasix 40mg Stat, check BNP  cw supplemental O2 and BIPAP as needed  Unlikely PE with an INR>2    2. AECopd:  Solumedrol 40mg IV q8hrs and taper as tolarated  Albuterol/Atrovent nebs  Bipap mask as needed  Pulmonary evaluation noted  Cefepime 0.5gm q12hrs for probable underlying bacterial resp infection day#5    3. Influenza A  Tamiflu 75mg po BID day#5/5    4. ARF superimposed on Ckd stg III: improved, likely due to prerenal azotemia: improved, Cr at baseline  d/c IV hydration due to likely pulmonary fluid overload    5. AOCD: stable Hb    6. Atrial fibrillation:   rate controlled, cw Cardizem 60mg po q8hrs  INR target: 2-3, restart Coumadin for INR<3
KEYONNA DEL ROSARIO  MRN: [id number]    S: 2/4/17 - Chart reviewed. Denies dyspnea. Tired. Comfortable at rest.    2/5/17 - Feels better today. No complaints. Comfortable.     PAST MEDICAL & SURGICAL HISTORY:  Anemia, unspecified type  Stage 2 chronic kidney disease  Acute congestive heart failure, unspecified congestive heart failure type  Hypothyroidism, unspecified type  Chronic obstructive pulmonary disease, unspecified COPD type  Atrial fibrillation, unspecified type  H/O prior ablation treatment: chemical  S/P hip replacement, left: previous ORIF to left hip about 2wks prior to admission  No significant past surgical history      O: T(C): 36.5, Max: 36.9 (02-05 @ 05:00)  HR: 65 (65 - 89)  BP: 92/46 (85/31 - 117/50)  RR: 13 (13 - 22)  SpO2: 100% (73% - 100%)  Wt(kg): --    PHYSICAL EXAM:      GENERAL: Comfortable    NEURO: awake; alert.      NECK: No JVD    CHEST: Clear with good air entry bilaterally    CARDIAC: irreg    EXT: no edema    ABG - ( 03 Feb 2017 13:43 )  pH: 7.35  /  pCO2: 58    /  pO2: 68    / HCO3: 31    / Base Excess: 5     /  SaO2: 92        04 Feb 2017 06:26    139    |  97     |  28     ----------------------------<  138    4.3     |  37     |  1.30     Ca    9.3        04 Feb 2017 06:26    PROCEDURE DATE:  02/03/2017        INTERPRETATION:  History: Dyspnea    Chest:  one view.      Comparison: 1/30/2017    AP radiograph of the chest demonstrates mild vascular congestion with   small BILATERAL pleural effusions and LEFT lower lobe infiltrate. The   cardiac silhouette is normal in size. Osseous structures are intact.    Impression:mild vascular congestion with small BILATERAL pleural   effusions and LEFT lower lobe infiltrate.     MEDICATIONS  (STANDING):  ergocalciferol Drops 2000Unit(s) Oral daily  diltiazem    Tablet 60milliGRAM(s) Oral every 8 hours  ALBUTerol for Nebulization 500MICROGram(s) Inhalation every 6 hours 0.083% 2.5milliGRAM(s) Nebulizer every 6 hours  ipratropium  aspirin  chewable 81milliGRAM(s) Oral daily  calcium carbonate 1250 mG (OsCal) 1Tablet(s) Oral two times a day  nicotine -  14 mG/24Hr(s) Patch 1patch Transdermal daily  warfarin 2milliGRAM(s) Oral daily  cefuroxime   Tablet 250milliGRAM(s) Oral every 12 hours  predniSONE   Tablet 40milliGRAM(s) Oral daily  pantoprazole    Tablet 40milliGRAM(s) Oral before breakfast    MEDICATIONS  (PRN):  acetaminophen   Tablet 650milliGRAM(s) Oral every 6 hours PRN pain or fever        A/P: 1.- Respiratory failure.     2. - Influenza infection with suspected superimposed pneumonia      3. - Acute exacerbation of COPD     4. - Hx of Pulmonary fibrosis/COPD     5. - Status post recent hip repair     6. - Diastolic heart failure/atrial fib - per cardiology.     PLAN: Patient continues to improve. Decrease steroids as pt improves / continue bronchodilators / antibiotics - O2 supplement. BiPAP prn and qhs.
patient currently on non rebreathing mask with sat of 100 in A.M after the bipap in view of destruction patient was kept on non rebreathing mask . no fever      PAST MEDICAL & SURGICAL HISTORY:  Anemia, unspecified type  Stage 2 chronic kidney disease  Acute congestive heart failure, unspecified congestive heart failure type  Hypothyroidism, unspecified type  Chronic obstructive pulmonary disease, unspecified COPD type  Atrial fibrillation, unspecified type  H/O prior ablation treatment: chemical  S/P hip replacement, left: previous ORIF to left hip about 2wks prior to admission  No significant past surgical history      Vital Signs Last 24 Hrs  T(C): 36.8, Max: 36.8 (02-03 @ 01:05)  T(F): 98.2, Max: 98.2 (02-03 @ 01:05)  HR: 73 (66 - 98)  BP: 118/61 (96/46 - 126/57)  BP(mean): 75 (56 - 95)  RR: 17 (13 - 23)  SpO2: 100% (78% - 100%)    PHYSICAL EXAMINATION:    GENERAL: The patient is awake and alert     HEENT: Head is normocephalic and atraumatic.     NECK: Supple, no jvd .    LUNGS: shows bilateral rales over the bases     HEART: Regular rate and rhythm without murmur.    ABDOMEN: Soft, nontender, and nondistended.      EXTREMITIES: No edema of calf tenderness     NEUROLOGIC: Grossly intact    MEDICATIONS  (STANDING):  ergocalciferol Drops 2000Unit(s) Oral daily  diltiazem    Tablet 60milliGRAM(s) Oral every 8 hours  cefepime  IVPB  IV Intermittent   ALBUTerol    0.083% 2.5milliGRAM(s) Nebulizer every 6 hours  sodium chloride 0.9%. 1000milliLiter(s) IV Continuous <Continuous>  ipratropium    for Nebulization 500MICROGram(s) Inhalation every 6 hours  cefepime  IVPB 500milliGRAM(s) IV Intermittent every 12 hours  aspirin  chewable 81milliGRAM(s) Oral daily  oseltamivir 30milliGRAM(s) Oral daily  calcium carbonate 1250 mG (OsCal) 1Tablet(s) Oral two times a day  methylPREDNISolone sodium succinate Injectable 40milliGRAM(s) IV Push three times a day  sodium chloride 0.9%. 500milliLiter(s) IV Continuous <Continuous>  nicotine -  14 mG/24Hr(s) Patch 1patch Transdermal daily      MEDICATIONS  (PRN):  acetaminophen   Tablet 650milliGRAM(s) Oral every 6 hours PRN pain or fever      .    LABS:                        10.3   10.2  )-----------( 308      ( 03 Feb 2017 05:18 )             34.0     03 Feb 2017 05:18    141    |  104    |  25     ----------------------------<  154    4.4     |  31     |  1.39     Ca    8.8        03 Feb 2017 05:18      PT/INR - ( 03 Feb 2017 05:18 )   PT: 41.3 sec;   INR: 3.71 ratio             ABG - ( 01 Feb 2017 11:41 )  pH: 7.32  /  pCO2: 59    /  pO2: 77    / HCO3: 29    / Base Excess: 2     /  SaO2: 93
patient says feeling better with no current cough or wheezing or chest pain on 3.5 o2 by nasal canula     PAST MEDICAL & SURGICAL HISTORY:  Anemia, unspecified type  Stage 2 chronic kidney disease  Acute congestive heart failure, unspecified congestive heart failure type  Hypothyroidism, unspecified type  Chronic obstructive pulmonary disease, unspecified COPD type  Atrial fibrillation, unspecified type  H/O prior ablation treatment: chemical  S/P hip replacement, left: previous ORIF to left hip about 2wks prior to admission  No significant past surgical history    Vital Signs Last 24 Hrs  T(C): 36.8, Max: 37 (02-06 @ 20:11)  T(F): 98.3, Max: 98.6 (02-06 @ 20:11)  HR: 81 (76 - 91)  BP: 128/58 (99/51 - 128/58)  BP(mean): 60 (60 - 73)  RR: 18 (17 - 20)  SpO2: 91% (88% - 95%)        PHYSICAL EXAMINATION:    GENERAL: The patient is awake and alert     HEENT: Head is normocephalic and atraumatic.     NECK: Supple, no jvd .    LUNGS: shows bilateral few rales over the bases with no wheeze or rhonchi     HEART: Regular rate and rhythm without murmur.    ABDOMEN: Soft, nontender, and nondistended.      EXTREMITIES: No edema of calf tenderness     NEUROLOGIC: Grossly intact    MEDICATIONS  (STANDING):  ergocalciferol Drops 2000Unit(s) Oral daily  diltiazem    Tablet 60milliGRAM(s) Oral every 8 hours  ALBUTerol    0.083% 2.5milliGRAM(s) Nebulizer every 6 hours  ipratropium    for Nebulization 500MICROGram(s) Inhalation every 6 hours  aspirin  chewable 81milliGRAM(s) Oral daily  calcium carbonate 1250 mG (OsCal) 1Tablet(s) Oral two times a day  nicotine -  14 mG/24Hr(s) Patch 1patch Transdermal daily  cefuroxime   Tablet 250milliGRAM(s) Oral every 12 hours  predniSONE   Tablet 40milliGRAM(s) Oral daily  pantoprazole    Tablet 40milliGRAM(s) Oral before breakfast  .
patient slowely improving with acceptable sat on 4 lit by the nasal canula with trying to walk with the physical therapy     PAST MEDICAL & SURGICAL HISTORY:  Anemia, unspecified type  Stage 2 chronic kidney disease  Acute congestive heart failure, unspecified congestive heart failure type  Hypothyroidism, unspecified type  Chronic obstructive pulmonary disease, unspecified COPD type  Atrial fibrillation, unspecified type  H/O prior ablation treatment: chemical  S/P hip replacement, left: previous ORIF to left hip about 2wks prior to admission  No significant past surgical history      Vital Signs Last 24 Hrs  T(C): 36.8, Max: 36.8 (02-05 @ 15:38)  T(F): 98.2, Max: 98.3 (02-05 @ 15:38)  HR: 81 (70 - 90)  BP: 100/49 (95/38 - 134/122)  BP(mean): 60 (53 - 125)  RR: 20 (17 - 27)  SpO2: 88% (88% - 100%)  PHYSICAL EXAMINATION:    GENERAL: The patient is awake and alert     HEENT: Head is normocephalic and atraumatic.     NECK: Supple, no jvd .    LUNGS: shows bilateral occasional rhonchi which seems to be improving     HEART: Regular rate and rhythm without murmur.    ABDOMEN: Soft, nontender, and nondistended.      EXTREMITIES: No edema of calf tenderness     NEUROLOGIC: Grossly intact    MEDICATIONS  (STANDING):  ergocalciferol Drops 2000Unit(s) Oral daily  diltiazem    Tablet 60milliGRAM(s) Oral every 8 hours  ALBUTerol    0.083% 2.5milliGRAM(s) Nebulizer every 6 hours  ipratropium    for Nebulization 500MICROGram(s) Inhalation every 6 hours  aspirin  chewable 81milliGRAM(s) Oral daily  calcium carbonate 1250 mG (OsCal) 1Tablet(s) Oral two times a day  nicotine -  14 mG/24Hr(s) Patch 1patch Transdermal daily  warfarin 2milliGRAM(s) Oral daily  cefuroxime   Tablet 250milliGRAM(s) Oral every 12 hours  predniSONE   Tablet 40milliGRAM(s) Oral daily  pantoprazole    Tablet 40milliGRAM(s) Oral before breakfast    .
Patient is awake on bipap with no distress still has cough and wheeze     PAST MEDICAL & SURGICAL HISTORY:  Anemia, unspecified type  Stage 2 chronic kidney disease  Acute congestive heart failure, unspecified congestive heart failure type  Hypothyroidism, unspecified type  Chronic obstructive pulmonary disease, unspecified COPD type  Atrial fibrillation, unspecified type  H/O prior ablation treatment: chemical  S/P hip replacement, left: previous ORIF to left hip about 2wks prior to admission  No significant past surgical history      Vital Signs Last 24 Hrs  T(C): 36.8, Max: 36.8 (01-31 @ 21:48)  T(F): 98.2, Max: 98.3 (01-31 @ 21:48)  HR: 61 (61 - 774)  BP: 94/45 (82/50 - 129/59)  BP(mean): 57 (48 - 77)  RR: 20 (13 - 26)  SpO2: 90% (85% - 100%)    PHYSICAL EXAMINATION:    GENERAL: The patient is awake and alert in no apparent distress on bipap  HEENT: Head is normocephalic and atraumatic.     NECK: Supple, no jvd .    LUNGS: has occasional rhochi with no wheeze     HEART: Regular rate and rhythm without murmur.    ABDOMEN: Soft, nontender, and nondistended.      EXTREMITIES: No edema of calf tenderness     NEUROLOGIC: Grossly intact    MEDICATIONS  (STANDING):  ergocalciferol Drops 2000Unit(s) Oral daily  diltiazem    Tablet 60milliGRAM(s) Oral every 8 hours  cefepime  IVPB  IV Intermittent   ALBUTerol    0.083% 2.5milliGRAM(s) Nebulizer every 6 hours  sodium chloride 0.9%. 1000milliLiter(s) IV Continuous <Continuous>  ipratropium    for Nebulization 500MICROGram(s) Inhalation every 6 hours  cefepime  IVPB 500milliGRAM(s) IV Intermittent every 12 hours  aspirin  chewable 81milliGRAM(s) Oral daily  oseltamivir 30milliGRAM(s) Oral daily  calcium carbonate 1250 mG (OsCal) 1Tablet(s) Oral two times a day  methylPREDNISolone sodium succinate Injectable 40milliGRAM(s) IV Push three times a day  warfarin 2milliGRAM(s) Oral <User Schedule>  sodium chloride 0.9%. 500milliLiter(s) IV Continuous <Continuous>      MEDICATIONS  (PRN):  acetaminophen   Tablet 650milliGRAM(s) Oral every 6 hours PRN pain or fever      .    LABS:                        10.0   x     )-----------( x        ( 31 Jan 2017 05:50 )             32.6     01 Feb 2017 04:45    142    |  106    |  26     ----------------------------<  136    4.4     |  30     |  1.45     Ca    8.2        01 Feb 2017 04:45      PT/INR - ( 01 Feb 2017 04:45 )   PT: 58.9 sec;   INR: 5.27 ratio             ABG - ( 30 Jan 2017 12:07 )  pH: 7.36  /  pCO2: 45    /  pO2: 78    / HCO3: 25    / Base Excess: 0     /  SaO2: 94                      Serum Pro-Brain Natriuretic Peptide: 18957 pg/mL (01-30 @ 00:48)

## 2017-02-07 NOTE — DISCHARGE NOTE ADULT - MEDICATION SUMMARY - MEDICATIONS TO TAKE
I will START or STAY ON the medications listed below when I get home from the hospital:    predniSONE 20 mg oral tablet  -- 2 tab(s) by mouth once a day  -- taper by 10mg every 2 days   -- Indication: For Copd    aspirin 81 mg oral tablet  -- 1 tab(s) by mouth once a day  -- Indication: For Htn    acetaminophen 325 mg oral tablet  -- 2 tab(s) by mouth every 6 hours, As needed, pain or fever  -- Indication: For pain    calcium (as carbonate) 500 mg oral tablet  --  by mouth 2 times a day  -- Indication: For osteoporosis    Cardizem 60 mg oral tablet  --  by mouth every 8 hours  -- Indication: For Afib    Coumadin 2 mg oral tablet  -- 1 tab(s) by mouth once a day (in the evening)  -- Indication: For Afib    albuterol 2.5 mg/3 mL (0.083%) inhalation solution  --  inhaled   -- Indication: For Copd    ipratropium 500 mcg/2.5 mL inhalation solution  -- 2.5 milliliter(s) inhaled every 6 hours  -- Indication: For Copd    cefuroxime 250 mg oral tablet  -- 1 tab(s) by mouth every 12 hours  -- for 3 more days  -- Indication: For Pna    pantoprazole 40 mg oral delayed release tablet  -- 1 tab(s) by mouth once a day (before a meal)  -- Indication: For gerd    nicotine 14 mg/24 hr transdermal film, extended release  --  by transdermal patch   -- Indication: For Smoking cessation    Vitamin D2  -- 2000 unit(s) by mouth once a day  -- Indication: For osteoporosis

## 2017-02-07 NOTE — CONSULT NOTE ADULT - SUBJECTIVE AND OBJECTIVE BOX
Patient seen and examined after being admitted for hyoxia/HCAP. Pt had L hip IMN 1/2/17 w/ dr davila. Pain controlled and denies left hip pain. pt ambulates with physical therapy. denies cp/sob/fevers/chills        MEDICATIONS  (STANDING):  ergocalciferol Drops 2000Unit(s) Oral daily  diltiazem    Tablet 60milliGRAM(s) Oral every 8 hours  ALBUTerol    0.083% 2.5milliGRAM(s) Nebulizer every 6 hours  ipratropium    for Nebulization 500MICROGram(s) Inhalation every 6 hours  aspirin  chewable 81milliGRAM(s) Oral daily  calcium carbonate 1250 mG (OsCal) 1Tablet(s) Oral two times a day  nicotine -  14 mG/24Hr(s) Patch 1patch Transdermal daily  cefuroxime   Tablet 250milliGRAM(s) Oral every 12 hours  predniSONE   Tablet 40milliGRAM(s) Oral daily  pantoprazole    Tablet 40milliGRAM(s) Oral before breakfast    Allergies    No Known Allergies    Intolerances                            10.6   16.6  )-----------( 299      ( 06 Feb 2017 06:18 )             34.4     06 Feb 2017 06:18    138    |  95     |  31     ----------------------------<  140    4.5     |  38     |  1.29     Ca    9.6        06 Feb 2017 06:18      PT/INR - ( 06 Feb 2017 06:18 )   PT: 29.5 sec;   INR: 2.66 ratio           Vital Signs Last 24 Hrs  T(C): 36.8, Max: 37 (02-06 @ 20:11)  T(F): 98.3, Max: 98.6 (02-06 @ 20:11)  HR: 81 (76 - 91)  BP: 128/58 (99/51 - 128/58)  BP(mean): --  RR: 18 (17 - 18)  SpO2: 91% (91% - 95%)    Physical Exam  Gen: NAD  L LE:   scar well appearing and healed  no ttp over hip or scar, no erythema or discharge  +ehl/fhl/ta/gs function  L2-S1 silt  Dp/pt pulse intact  No calf ttp  Compartments soft

## 2017-02-07 NOTE — DISCHARGE NOTE ADULT - HOSPITAL COURSE
Patient admitted with respiratory failure due to Influenza, Pna, COPD; treated with Tamiflu, Cephalosporins, Steroids and Bronchodilators. Patient improved and is d/c to Rehab on Prednisone taper and O2 via NC    1. Acute on chronic Resp failure due to AECopd/Influenza and acute diastolic Chf:  -patient has gotten worse due to  fluid overload....s/p IV Lasix 40mg with improvement; cw oral diurrhetics,   cw supplemental O2 and BIPAP as needed  Unlikely PE with an INR>2    2. AECopd:  change steroids to prednisone  Albuterol/Atrovent nebs  Bipap mask as needed  Pulmonary evaluation noted  Cefepime 0.5gm q12hrs for probable underlying bacterial resp infection day#6, will change to Ceftin    3. Influenza A  Tamiflu 75mg po BID day#5/5    4. ARF superimposed on Ckd stg III: improved, likely due to prerenal azotemia: improved, Cr at baseline  d/c IV hydration due to likely pulmonary fluid overload    5. AOCD: stable Hb    6. Atrial fibrillation:   rate controlled, cw Cardizem 60mg po q8hrs  INR target: 2-3, restart Coumadin for INR<3    7. Acute on chronic diastolic Chf:  resolved  cw oral Lasix    PHYSICAL EXAM:    GENERAL: NAD, Well nourished  HEENT:  NC/AT, EOMI, PERRLA, No scleral icterus, Moist mucous membranes  NECK: Supple, No JVD  CNS:  Alert & Oriented X2, Motor Strength 5/5 B/L upper and lower extremities; DTRs 2+ intact   LUNG: Decreased Breath sounds, Clear to auscultation bilaterally, No rales, No rhonchi, No wheezing  HEART: S1, S2 present; No murmurs, No rubs  ABDOMEN: +BS, ST/ND/NT  GENITOURINARY- Voiding, Bladder not distended  EXTREMITIES:  2+ Peripheral Pulses, No clubbing, No cyanosis, No tibial edema  MUSCULOSKELTAL: Joints normal ROM, No joint tenderness, No muscle tenderness  VAGINAL: deferred  RECTAL: deferred, not indicated  BREAST: deferred

## 2017-02-07 NOTE — DISCHARGE NOTE ADULT - NS AS DC VTE INSTRUCTION
Coumadin/Warfarin - Follow-up monitoring.../Coumadin/Warfarin - Potential for adverse drug reactions and interactions/Coumadin/Warfarin - Dietary Advice.../Coumadin/Warfarin - Compliance...

## 2017-02-07 NOTE — DISCHARGE NOTE ADULT - CARE PROVIDER_API CALL
Tianna Rodríguez), Critical Care Medicine; Internal Medicine; Pulmonary Disease; Sleep Medicine  [address] [place] [address]  Phone: [phone]  Fax: [phone]

## 2017-02-07 NOTE — CONSULT NOTE ADULT - ASSESSMENT
A/P:  81yFemale s/p L Hip IMN (1/2/17) admitted with hypoxia and PNA   Pain control  DVT ppx  WBAT, aggressive pt/ot  FU labs  Incentive spirometry  Ortho stable for DC

## 2017-02-07 NOTE — PROGRESS NOTE ADULT - PROVIDER SPECIALTY LIST ADULT
Hospitalist
Pulmonology
Cardiology
Hospitalist

## 2017-02-07 NOTE — DISCHARGE NOTE ADULT - NS MD DC FALL RISK RISK
For information on Fall & Injury Prevention, visit [email or web address]

## 2017-02-07 NOTE — DISCHARGE NOTE ADULT - SECONDARY DIAGNOSIS.
Acute congestive heart failure, unspecified congestive heart failure type Atrial fibrillation, unspecified type S/P hip replacement, left Stage 2 chronic kidney disease

## 2017-02-07 NOTE — PROGRESS NOTE ADULT - ASSESSMENT
1. Acute on chronic Resp failure due to AECopd and Influenza  improved  cw supplemental O2 prn  Unlikely PE with an INR=5    2. AECopd:  Solumedrol 40mg IV q6hrs and taper as tolarated  Albuterol/Atrovent nebs  Bipap mask  Pulmonary evaluation  Cefepime 0.5gm q12hrs for probable underlying bacterial resp infection    3. Influenza A  Tamiflu 75mg po BID for 5days    4. ARF superimposed on Ckd stg III: improving, likely due to prerenal azotemia  d/c IV hydration    5. AOCD: stable Hb    6. Atrial fibrillation:   rate controlled, cw Cardizem 60mg po q8hrs  INR target: 2-3
- Hypoxaemic and hypercarbic respiratory failure     - influenza infection with suspected superimposed pneumonia      - acute exacerbation of copd     - base line pulmonary fibrosis with copd     - status post recent hip repair     PLAN     - Continue the current the current management and taper the steroids 40 mg 12 q hrly  from today and use of bipap and continuation of nebs and monitor for improvement and in event of lethargy obtain ABG . repeat cxr in 24 hours
81 year old woman with AF on AC, COPD presents with COPD exacerbation and influenza with hypoxia    AF- rate controlled continue cardizem- , goal INR 2-3    Hypoxia- supplemental o2, abx, antivirals and steroids, nebs as per medicine    Diastolic HF- monitor I/O, hold lasix for now, daily weights. resume low dose lasix when euvolemic to maintain stable fluid status     please call as neededed
Patient is seen and full consult dictated     - influenza infection with suspected superimposed pneumonia      - acute exacerbation of copd     - base line pulmonary fibrosis with copd     - status post recent hip repair     PLAN     - Continue the current the current management and taper the steroids 40 mg q hrly
- Hypoxaemic and hypercarbic respiratory failure     - influenza infection with suspected superimposed pneumonia      - acute exacerbation of copd on steroids     - base line pulmonary fibrosis with copd     - status post recent hip repair     PLAN     - patient currently on non rebreathing mask would give trial of venti mask 50 while monitoring sat .    - repeat ABG for the pco2 on venti mask     - repeat cxr     - continue with the antibiotics and steroids .    - use of bipap prn during day time and for continuous at night and discontinue fluids
- Hypoxaemic and hypercarbic respiratory failure     - influenza infection with suspected superimposed pneumonia currently on po antibiotics ceftin     - acute exacerbation of copd on steroids on po prednisone     - base line pulmonary fibrosis with copd     - status post recent hip repair     PLAN     - patient slowly improving and trying to ambulate yesterday .    - continue with the antibiotics and steroids along with nebs taper prednisone in 5 days . mobilization with the 02 and discharge planning as per the medicine
- Hypoxaemic and hypercarbic respiratory failure slowly improving     - influenza infection with suspected superimposed pneumonia currently on po antibiotics     - acute exacerbation of copd on steroids on po prednisone     - base line pulmonary fibrosis with copd     - status post recent hip repair     PLAN     - patient slowly improving .    - continue with the antibiotics and steroids along with nebs and encourage ambulation and use of BiPAP on prn basis for the hypercaribia and follow up cxr noted
1. Acute on chronic Resp failure due to AECopd and Influenza  slightly improved but still hypoxic  cw supplemental O2 and BIPAP  Unlikely PE with an INR>2    2. AECopd:  Solumedrol 40mg IV q6hrs and taper as tolarated  Albuterol/Atrovent nebs  Bipap mask  Pulmonary evaluation noted  Cefepime 0.5gm q12hrs for probable underlying bacterial resp infection    3. Influenza A  Tamiflu 75mg po BID day#3/5    4. ARF superimposed on Ckd stg III: improving, likely due to prerenal azotemia: improved, Cr at baseline  d/c IV hydration    5. AOCD: stable Hb    6. Atrial fibrillation:   rate controlled, cw Cardizem 60mg po q8hrs  INR target: 2-3, hold Coumadin today
A/P:    1. Acute on chronic Resp failure due to AECopd and Influenza  slightly improved but still hypoxic  cw supplemental O2 and BIPAP  Unlikely PE with an INR>2    2. AECopd:  Solumedrol 40mg IV q8hrs and taper as tolarated  Albuterol/Atrovent nebs  Bipap mask as needed  Pulmonary evaluation noted  Cefepime 0.5gm q12hrs for probable underlying bacterial resp infection    3. Influenza A  Tamiflu 75mg po BID day#4/5    4. ARF superimposed on Ckd stg III: improving, likely due to prerenal azotemia: improved, Cr at baseline  d/c IV hydration    5. AOCD: stable Hb    6. Atrial fibrillation:   rate controlled, cw Cardizem 60mg po q8hrs  INR target: 2-3, restart Coumadin for INR<3

## 2017-02-08 VITALS
RESPIRATION RATE: 18 BRPM | HEART RATE: 91 BPM | TEMPERATURE: 99 F | SYSTOLIC BLOOD PRESSURE: 127 MMHG | DIASTOLIC BLOOD PRESSURE: 67 MMHG

## 2017-02-08 RX ADMIN — Medication 81 MILLIGRAM(S): at 11:08

## 2017-02-08 RX ADMIN — ALBUTEROL 2.5 MILLIGRAM(S): 90 AEROSOL, METERED ORAL at 13:40

## 2017-02-08 RX ADMIN — Medication 250 MILLIGRAM(S): at 05:23

## 2017-02-08 RX ADMIN — PANTOPRAZOLE SODIUM 40 MILLIGRAM(S): 20 TABLET, DELAYED RELEASE ORAL at 11:07

## 2017-02-08 RX ADMIN — Medication 500 MICROGRAM(S): at 07:39

## 2017-02-08 RX ADMIN — Medication 1 PATCH: at 11:10

## 2017-02-08 RX ADMIN — Medication 40 MILLIGRAM(S): at 05:23

## 2017-02-08 RX ADMIN — Medication 500 MICROGRAM(S): at 13:40

## 2017-02-08 RX ADMIN — ALBUTEROL 2.5 MILLIGRAM(S): 90 AEROSOL, METERED ORAL at 01:58

## 2017-02-08 RX ADMIN — Medication 1 TABLET(S): at 11:07

## 2017-02-08 RX ADMIN — Medication 1 PATCH: at 11:08

## 2017-02-08 RX ADMIN — ALBUTEROL 2.5 MILLIGRAM(S): 90 AEROSOL, METERED ORAL at 07:40

## 2017-02-08 RX ADMIN — Medication 500 MICROGRAM(S): at 01:58

## 2017-02-13 DIAGNOSIS — N18.3 CHRONIC KIDNEY DISEASE, STAGE 3 (MODERATE): ICD-10-CM

## 2017-02-13 DIAGNOSIS — D63.1 ANEMIA IN CHRONIC KIDNEY DISEASE: ICD-10-CM

## 2017-02-13 DIAGNOSIS — I48.91 UNSPECIFIED ATRIAL FIBRILLATION: ICD-10-CM

## 2017-02-13 DIAGNOSIS — J18.9 PNEUMONIA, UNSPECIFIED ORGANISM: ICD-10-CM

## 2017-02-13 DIAGNOSIS — Z79.82 LONG TERM (CURRENT) USE OF ASPIRIN: ICD-10-CM

## 2017-02-13 DIAGNOSIS — J96.21 ACUTE AND CHRONIC RESPIRATORY FAILURE WITH HYPOXIA: ICD-10-CM

## 2017-02-13 DIAGNOSIS — J44.0 CHRONIC OBSTRUCTIVE PULMONARY DISEASE WITH ACUTE LOWER RESPIRATORY INFECTION: ICD-10-CM

## 2017-02-13 DIAGNOSIS — Z99.81 DEPENDENCE ON SUPPLEMENTAL OXYGEN: ICD-10-CM

## 2017-02-13 DIAGNOSIS — Z79.899 OTHER LONG TERM (CURRENT) DRUG THERAPY: ICD-10-CM

## 2017-02-13 DIAGNOSIS — J11.1 INFLUENZA DUE TO UNIDENTIFIED INFLUENZA VIRUS WITH OTHER RESPIRATORY MANIFESTATIONS: ICD-10-CM

## 2017-02-13 DIAGNOSIS — J44.1 CHRONIC OBSTRUCTIVE PULMONARY DISEASE WITH (ACUTE) EXACERBATION: ICD-10-CM

## 2017-02-13 DIAGNOSIS — Z79.01 LONG TERM (CURRENT) USE OF ANTICOAGULANTS: ICD-10-CM

## 2017-02-13 DIAGNOSIS — N17.9 ACUTE KIDNEY FAILURE, UNSPECIFIED: ICD-10-CM

## 2017-02-13 DIAGNOSIS — I50.33 ACUTE ON CHRONIC DIASTOLIC (CONGESTIVE) HEART FAILURE: ICD-10-CM

## 2017-02-13 DIAGNOSIS — E03.9 HYPOTHYROIDISM, UNSPECIFIED: ICD-10-CM

## 2017-02-15 DIAGNOSIS — J10.1 INFLUENZA DUE TO OTHER IDENTIFIED INFLUENZA VIRUS WITH OTHER RESPIRATORY MANIFESTATIONS: ICD-10-CM

## 2017-02-15 DIAGNOSIS — J10.00 INFLUENZA DUE TO OTHER IDENTIFIED INFLUENZA VIRUS WITH UNSPECIFIED TYPE OF PNEUMONIA: ICD-10-CM

## 2017-02-22 ENCOUNTER — INPATIENT (INPATIENT)
Facility: HOSPITAL | Age: 82
LOS: 13 days | DRG: 871 | End: 2017-03-08
Attending: INTERNAL MEDICINE | Admitting: INTERNAL MEDICINE
Payer: MEDICARE

## 2017-02-22 VITALS — HEIGHT: 66 IN | WEIGHT: 145.95 LBS

## 2017-02-22 DIAGNOSIS — Z98.890 OTHER SPECIFIED POSTPROCEDURAL STATES: Chronic | ICD-10-CM

## 2017-02-22 DIAGNOSIS — R09.02 HYPOXEMIA: ICD-10-CM

## 2017-02-22 DIAGNOSIS — Z96.642 PRESENCE OF LEFT ARTIFICIAL HIP JOINT: Chronic | ICD-10-CM

## 2017-02-22 LAB
ALBUMIN SERPL ELPH-MCNC: 2.6 G/DL — LOW (ref 3.3–5)
ALP SERPL-CCNC: 75 U/L — SIGNIFICANT CHANGE UP (ref 40–120)
ALT FLD-CCNC: 16 U/L — SIGNIFICANT CHANGE UP (ref 12–78)
ANION GAP SERPL CALC-SCNC: 9 MMOL/L — SIGNIFICANT CHANGE UP (ref 5–17)
APPEARANCE UR: CLEAR — SIGNIFICANT CHANGE UP
APTT BLD: 40.4 SEC — HIGH (ref 27.5–37.4)
AST SERPL-CCNC: 20 U/L — SIGNIFICANT CHANGE UP (ref 15–37)
BASE EXCESS BLDA CALC-SCNC: 7.6 MMOL/L — HIGH (ref -2–2)
BASE EXCESS BLDA CALC-SCNC: 9.5 MMOL/L — HIGH (ref -2–2)
BASOPHILS # BLD AUTO: 0.1 K/UL — SIGNIFICANT CHANGE UP (ref 0–0.2)
BASOPHILS NFR BLD AUTO: 1 % — SIGNIFICANT CHANGE UP (ref 0–2)
BILIRUB SERPL-MCNC: 0.5 MG/DL — SIGNIFICANT CHANGE UP (ref 0.2–1.2)
BILIRUB UR-MCNC: NEGATIVE — SIGNIFICANT CHANGE UP
BLOOD GAS COMMENTS ARTERIAL: SIGNIFICANT CHANGE UP
BUN SERPL-MCNC: 27 MG/DL — HIGH (ref 7–23)
CALCIUM SERPL-MCNC: 8.4 MG/DL — LOW (ref 8.5–10.1)
CHLORIDE SERPL-SCNC: 95 MMOL/L — LOW (ref 96–108)
CK MB BLD-MCNC: 3 % — SIGNIFICANT CHANGE UP (ref 0–3.5)
CK MB CFR SERPL CALC: 1.1 NG/ML — SIGNIFICANT CHANGE UP (ref 0–3.6)
CK SERPL-CCNC: 37 U/L — SIGNIFICANT CHANGE UP (ref 26–192)
CO2 SERPL-SCNC: 33 MMOL/L — HIGH (ref 22–31)
COLOR SPEC: YELLOW — SIGNIFICANT CHANGE UP
CREAT SERPL-MCNC: 1.5 MG/DL — HIGH (ref 0.5–1.3)
DIFF PNL FLD: NEGATIVE — SIGNIFICANT CHANGE UP
EOSINOPHIL # BLD AUTO: 0.1 K/UL — SIGNIFICANT CHANGE UP (ref 0–0.5)
EOSINOPHIL NFR BLD AUTO: 0.7 % — SIGNIFICANT CHANGE UP (ref 0–6)
GLUCOSE SERPL-MCNC: 118 MG/DL — HIGH (ref 70–99)
GLUCOSE UR QL: NEGATIVE — SIGNIFICANT CHANGE UP
HCO3 BLDA-SCNC: 31 MMOL/L — HIGH (ref 23–27)
HCO3 BLDA-SCNC: 32 MMOL/L — HIGH (ref 23–27)
HCT VFR BLD CALC: 34.2 % — LOW (ref 34.5–45)
HGB BLD-MCNC: 10.5 G/DL — LOW (ref 11.5–15.5)
HOROWITZ INDEX BLDA+IHG-RTO: 100 — SIGNIFICANT CHANGE UP
HOROWITZ INDEX BLDA+IHG-RTO: 50 — SIGNIFICANT CHANGE UP
INR BLD: 2.7 RATIO — HIGH (ref 0.88–1.16)
KETONES UR-MCNC: NEGATIVE — SIGNIFICANT CHANGE UP
LACTATE SERPL-SCNC: 2.5 MMOL/L — HIGH (ref 0.7–2)
LEUKOCYTE ESTERASE UR-ACNC: ABNORMAL
LIDOCAIN IGE QN: 83 U/L — SIGNIFICANT CHANGE UP (ref 73–393)
LYMPHOCYTES # BLD AUTO: 0.7 K/UL — LOW (ref 1–3.3)
LYMPHOCYTES # BLD AUTO: 5.6 % — LOW (ref 13–44)
MCHC RBC-ENTMCNC: 30.8 GM/DL — LOW (ref 32–36)
MCHC RBC-ENTMCNC: 31.3 PG — SIGNIFICANT CHANGE UP (ref 27–34)
MCV RBC AUTO: 101.7 FL — HIGH (ref 80–100)
MONOCYTES # BLD AUTO: 0.7 K/UL — SIGNIFICANT CHANGE UP (ref 0–0.9)
MONOCYTES NFR BLD AUTO: 5.7 % — SIGNIFICANT CHANGE UP (ref 1–9)
NEUTROPHILS # BLD AUTO: 10.9 K/UL — HIGH (ref 1.8–7.4)
NEUTROPHILS NFR BLD AUTO: 87 % — HIGH (ref 43–77)
NITRITE UR-MCNC: NEGATIVE — SIGNIFICANT CHANGE UP
NT-PROBNP SERPL-SCNC: 5006 PG/ML — HIGH (ref 0–450)
PCO2 BLDA: 52 MMHG — HIGH (ref 32–46)
PCO2 BLDA: 58 MMHG — HIGH (ref 32–46)
PH BLDA: 7.4 — SIGNIFICANT CHANGE UP (ref 7.35–7.45)
PH BLDA: 7.41 — SIGNIFICANT CHANGE UP (ref 7.35–7.45)
PH UR: 5 — SIGNIFICANT CHANGE UP (ref 4.8–8)
PLATELET # BLD AUTO: 195 K/UL — SIGNIFICANT CHANGE UP (ref 150–400)
PO2 BLDA: 33 MMHG — CRITICAL LOW (ref 74–108)
PO2 BLDA: 46 MMHG — CRITICAL LOW (ref 74–108)
POTASSIUM SERPL-MCNC: 4.5 MMOL/L — SIGNIFICANT CHANGE UP (ref 3.5–5.3)
POTASSIUM SERPL-SCNC: 4.5 MMOL/L — SIGNIFICANT CHANGE UP (ref 3.5–5.3)
PROT SERPL-MCNC: 6.7 G/DL — SIGNIFICANT CHANGE UP (ref 6–8.3)
PROT UR-MCNC: NEGATIVE — SIGNIFICANT CHANGE UP
PROTHROM AB SERPL-ACNC: 30.3 SEC — HIGH (ref 10–13.1)
RAPID RVP RESULT: SIGNIFICANT CHANGE UP
RBC # BLD: 3.36 M/UL — LOW (ref 3.8–5.2)
RBC # FLD: 20.3 % — HIGH (ref 10.3–14.5)
SAO2 % BLDA: 59 % — LOW (ref 92–96)
SAO2 % BLDA: 80 % — LOW (ref 92–96)
SODIUM SERPL-SCNC: 137 MMOL/L — SIGNIFICANT CHANGE UP (ref 135–145)
SP GR SPEC: 1.02 — SIGNIFICANT CHANGE UP (ref 1.01–1.02)
TROPONIN I SERPL-MCNC: 0.03 NG/ML — SIGNIFICANT CHANGE UP (ref 0.01–0.04)
UROBILINOGEN FLD QL: NEGATIVE — SIGNIFICANT CHANGE UP
WBC # BLD: 12.5 K/UL — HIGH (ref 3.8–10.5)
WBC # FLD AUTO: 12.5 K/UL — HIGH (ref 3.8–10.5)

## 2017-02-22 PROCEDURE — 71010: CPT | Mod: 26

## 2017-02-22 PROCEDURE — 93010 ELECTROCARDIOGRAM REPORT: CPT

## 2017-02-22 PROCEDURE — 99285 EMERGENCY DEPT VISIT HI MDM: CPT

## 2017-02-22 RX ORDER — VANCOMYCIN HCL 1 G
1000 VIAL (EA) INTRAVENOUS ONCE
Qty: 0 | Refills: 0 | Status: COMPLETED | OUTPATIENT
Start: 2017-02-22 | End: 2017-02-22

## 2017-02-22 RX ORDER — LINEZOLID 600 MG/300ML
INJECTION, SOLUTION INTRAVENOUS
Qty: 0 | Refills: 0 | Status: DISCONTINUED | OUTPATIENT
Start: 2017-02-23 | End: 2017-02-23

## 2017-02-22 RX ORDER — ACETYLCYSTEINE 200 MG/ML
3 VIAL (ML) MISCELLANEOUS EVERY 6 HOURS
Qty: 0 | Refills: 0 | Status: DISCONTINUED | OUTPATIENT
Start: 2017-02-22 | End: 2017-03-04

## 2017-02-22 RX ORDER — PANTOPRAZOLE SODIUM 20 MG/1
40 TABLET, DELAYED RELEASE ORAL DAILY
Qty: 0 | Refills: 0 | Status: DISCONTINUED | OUTPATIENT
Start: 2017-02-22 | End: 2017-02-25

## 2017-02-22 RX ORDER — PIPERACILLIN AND TAZOBACTAM 4; .5 G/20ML; G/20ML
3.38 INJECTION, POWDER, LYOPHILIZED, FOR SOLUTION INTRAVENOUS ONCE
Qty: 0 | Refills: 0 | Status: COMPLETED | OUTPATIENT
Start: 2017-02-22 | End: 2017-02-22

## 2017-02-22 RX ORDER — SODIUM CHLORIDE 9 MG/ML
1000 INJECTION INTRAMUSCULAR; INTRAVENOUS; SUBCUTANEOUS ONCE
Qty: 0 | Refills: 0 | Status: COMPLETED | OUTPATIENT
Start: 2017-02-22 | End: 2017-02-22

## 2017-02-22 RX ORDER — PIPERACILLIN AND TAZOBACTAM 4; .5 G/20ML; G/20ML
3.38 INJECTION, POWDER, LYOPHILIZED, FOR SOLUTION INTRAVENOUS EVERY 8 HOURS
Qty: 0 | Refills: 0 | Status: DISCONTINUED | OUTPATIENT
Start: 2017-02-22 | End: 2017-02-25

## 2017-02-22 RX ORDER — ALBUTEROL 90 UG/1
2.5 AEROSOL, METERED ORAL EVERY 6 HOURS
Qty: 0 | Refills: 0 | Status: DISCONTINUED | OUTPATIENT
Start: 2017-02-22 | End: 2017-02-24

## 2017-02-22 RX ORDER — ALBUTEROL 90 UG/1
1 AEROSOL, METERED ORAL EVERY 4 HOURS
Qty: 0 | Refills: 0 | Status: DISCONTINUED | OUTPATIENT
Start: 2017-02-22 | End: 2017-02-24

## 2017-02-22 RX ORDER — CHLORHEXIDINE GLUCONATE 213 G/1000ML
15 SOLUTION TOPICAL
Qty: 0 | Refills: 0 | Status: DISCONTINUED | OUTPATIENT
Start: 2017-02-22 | End: 2017-02-27

## 2017-02-22 RX ADMIN — Medication 125 MILLIGRAM(S): at 22:48

## 2017-02-22 RX ADMIN — SODIUM CHLORIDE 1000 MILLILITER(S): 9 INJECTION INTRAMUSCULAR; INTRAVENOUS; SUBCUTANEOUS at 22:00

## 2017-02-22 RX ADMIN — Medication 250 MILLIGRAM(S): at 23:30

## 2017-02-22 RX ADMIN — PIPERACILLIN AND TAZOBACTAM 200 GRAM(S): 4; .5 INJECTION, POWDER, LYOPHILIZED, FOR SOLUTION INTRAVENOUS at 22:49

## 2017-02-22 NOTE — ED ADULT NURSE NOTE - OBJECTIVE STATEMENT
pt sent from [facility] for low o2 sat. pt denies resp difficulty. niece states her normal sat without o2 is in the 60's in recent months. pt states she feels wonderful. RT at bedside obtaining ABG and putting pt on Bipap. head of bed elevated. 2 IV's initiated and normal saline bolus up.

## 2017-02-22 NOTE — ED PROVIDER NOTE - OBJECTIVE STATEMENT
83 yo female hx of COPD, afib on coumadin, sent from [facility] for low O2 saturation, went as low at 51% on RA, BIBEMS, on 12L NRB O2 sat 91%.  Patient denies any chest pain, no shortness of breath.  PMD Dr. Gamez at the facility.

## 2017-02-22 NOTE — ED PROVIDER NOTE - CARE PLAN
Principal Discharge DX:	Hypoxia  Secondary Diagnosis:	Chronic obstructive pulmonary disease, unspecified COPD type Principal Discharge DX:	Hypoxia  Secondary Diagnosis:	Chronic obstructive pulmonary disease, unspecified COPD type  Secondary Diagnosis:	Pneumonia, unspecified organism

## 2017-02-22 NOTE — ED ADULT TRIAGE NOTE - CHIEF COMPLAINT QUOTE
c/o respiratory distresss, had a oxygen saturation of 48% as per nursing home transfer papers, patient on Non rebreather

## 2017-02-22 NOTE — ED ADULT NURSE NOTE - PMH
Anemia    Atrial fibrillation    Chronic obstructive pulmonary disease, unspecified COPD type    Constipation    Difficulty in walking, not elsewhere classified    GERD without esophagitis    Hypertension    Influenza due to unidentified influenza virus with specified pneumonia    Muscle weakness    Nicotine dependence    Nondisplaced intertrochanteric fracture of left femur, subsequent encounter for closed fracture with routine healing    Pneumonia    Pressure ulcer of unspecified buttock, stage 2    Stiffness of left hip, not elsewhere classified

## 2017-02-22 NOTE — ED PROVIDER NOTE - CONSTITUTIONAL, MLM
normal... pale appearing, awake, alert, oriented to person, place, not time/situation and in no apparent distress.

## 2017-02-23 DIAGNOSIS — R09.02 HYPOXEMIA: ICD-10-CM

## 2017-02-23 LAB
ANION GAP SERPL CALC-SCNC: 10 MMOL/L — SIGNIFICANT CHANGE UP (ref 5–17)
BASE EXCESS BLDA CALC-SCNC: 1 MMOL/L — SIGNIFICANT CHANGE UP (ref -2–2)
BASE EXCESS BLDA CALC-SCNC: 2 MMOL/L — SIGNIFICANT CHANGE UP (ref -2–2)
BASE EXCESS BLDA CALC-SCNC: 2.1 MMOL/L — HIGH (ref -2–2)
BASE EXCESS BLDA CALC-SCNC: 4.3 MMOL/L — HIGH (ref -2–2)
BLOOD GAS COMMENTS ARTERIAL: SIGNIFICANT CHANGE UP
BUN SERPL-MCNC: 23 MG/DL — SIGNIFICANT CHANGE UP (ref 7–23)
CALCIUM SERPL-MCNC: 7.5 MG/DL — LOW (ref 8.5–10.1)
CHLORIDE SERPL-SCNC: 101 MMOL/L — SIGNIFICANT CHANGE UP (ref 96–108)
CHOLEST SERPL-MCNC: 175 MG/DL — SIGNIFICANT CHANGE UP (ref 10–199)
CO2 SERPL-SCNC: 29 MMOL/L — SIGNIFICANT CHANGE UP (ref 22–31)
CREAT SERPL-MCNC: 1.2 MG/DL — SIGNIFICANT CHANGE UP (ref 0.5–1.3)
CULTURE RESULTS: NO GROWTH — SIGNIFICANT CHANGE UP
GLUCOSE SERPL-MCNC: 179 MG/DL — HIGH (ref 70–99)
GRAM STN FLD: SIGNIFICANT CHANGE UP
HCO3 BLDA-SCNC: 24 MMOL/L — SIGNIFICANT CHANGE UP (ref 23–27)
HCO3 BLDA-SCNC: 26 MMOL/L — SIGNIFICANT CHANGE UP (ref 23–27)
HCT VFR BLD CALC: 29.2 % — LOW (ref 34.5–45)
HDLC SERPL-MCNC: 54 MG/DL — SIGNIFICANT CHANGE UP (ref 40–125)
HGB BLD-MCNC: 9.3 G/DL — LOW (ref 11.5–15.5)
HOROWITZ INDEX BLDA+IHG-RTO: 100 — SIGNIFICANT CHANGE UP
HOROWITZ INDEX BLDA+IHG-RTO: 100 — SIGNIFICANT CHANGE UP
HOROWITZ INDEX BLDA+IHG-RTO: 40 — SIGNIFICANT CHANGE UP
HOROWITZ INDEX BLDA+IHG-RTO: 60 — SIGNIFICANT CHANGE UP
INR BLD: 2.83 RATIO — HIGH (ref 0.88–1.16)
LACTATE SERPL-SCNC: 1.8 MMOL/L — SIGNIFICANT CHANGE UP (ref 0.7–2)
LIPID PNL WITH DIRECT LDL SERPL: 96 MG/DL — SIGNIFICANT CHANGE UP
MCHC RBC-ENTMCNC: 31.7 GM/DL — LOW (ref 32–36)
MCHC RBC-ENTMCNC: 32 PG — SIGNIFICANT CHANGE UP (ref 27–34)
MCV RBC AUTO: 100.7 FL — HIGH (ref 80–100)
PCO2 BLDA: 50 MMHG — HIGH (ref 32–46)
PCO2 BLDA: 56 MMHG — HIGH (ref 32–46)
PCO2 BLDA: 59 MMHG — HIGH (ref 32–46)
PCO2 BLDA: 72 MMHG — CRITICAL HIGH (ref 32–46)
PH BLDA: 7.26 — LOW (ref 7.35–7.45)
PH BLDA: 7.28 — LOW (ref 7.35–7.45)
PH BLDA: 7.32 — LOW (ref 7.35–7.45)
PH BLDA: 7.35 — SIGNIFICANT CHANGE UP (ref 7.35–7.45)
PLATELET # BLD AUTO: 206 K/UL — SIGNIFICANT CHANGE UP (ref 150–400)
PO2 BLDA: 106 MMHG — SIGNIFICANT CHANGE UP (ref 74–108)
PO2 BLDA: 73 MMHG — LOW (ref 74–108)
PO2 BLDA: 77 MMHG — SIGNIFICANT CHANGE UP (ref 74–108)
PO2 BLDA: 80 MMHG — SIGNIFICANT CHANGE UP (ref 74–108)
POTASSIUM SERPL-MCNC: 3.6 MMOL/L — SIGNIFICANT CHANGE UP (ref 3.5–5.3)
POTASSIUM SERPL-SCNC: 3.6 MMOL/L — SIGNIFICANT CHANGE UP (ref 3.5–5.3)
PROTHROM AB SERPL-ACNC: 31.8 SEC — HIGH (ref 10–13.1)
RBC # BLD: 2.9 M/UL — LOW (ref 3.8–5.2)
RBC # FLD: 19.6 % — HIGH (ref 10.3–14.5)
SAO2 % BLDA: 90 % — LOW (ref 92–96)
SAO2 % BLDA: 94 % — SIGNIFICANT CHANGE UP (ref 92–96)
SAO2 % BLDA: 94 % — SIGNIFICANT CHANGE UP (ref 92–96)
SAO2 % BLDA: 97 % — HIGH (ref 92–96)
SODIUM SERPL-SCNC: 140 MMOL/L — SIGNIFICANT CHANGE UP (ref 135–145)
SPECIMEN SOURCE: SIGNIFICANT CHANGE UP
SPECIMEN SOURCE: SIGNIFICANT CHANGE UP
TOTAL CHOLESTEROL/HDL RATIO MEASUREMENT: 3.2 RATIO — LOW (ref 3.3–7.1)
TRIGL SERPL-MCNC: 123 MG/DL — SIGNIFICANT CHANGE UP (ref 10–149)
TROPONIN I SERPL-MCNC: 0.02 NG/ML — SIGNIFICANT CHANGE UP (ref 0.01–0.04)
WBC # BLD: 12.7 K/UL — HIGH (ref 3.8–10.5)
WBC # FLD AUTO: 12.7 K/UL — HIGH (ref 3.8–10.5)

## 2017-02-23 PROCEDURE — 99291 CRITICAL CARE FIRST HOUR: CPT

## 2017-02-23 PROCEDURE — 71250 CT THORAX DX C-: CPT | Mod: 26

## 2017-02-23 PROCEDURE — 93010 ELECTROCARDIOGRAM REPORT: CPT

## 2017-02-23 PROCEDURE — 71010: CPT | Mod: 26

## 2017-02-23 RX ORDER — NYSTATIN CREAM 100000 [USP'U]/G
1 CREAM TOPICAL THREE TIMES A DAY
Qty: 0 | Refills: 0 | Status: DISCONTINUED | OUTPATIENT
Start: 2017-02-23 | End: 2017-03-08

## 2017-02-23 RX ORDER — SODIUM CHLORIDE 9 MG/ML
2000 INJECTION INTRAMUSCULAR; INTRAVENOUS; SUBCUTANEOUS ONCE
Qty: 0 | Refills: 0 | Status: COMPLETED | OUTPATIENT
Start: 2017-02-23 | End: 2017-02-23

## 2017-02-23 RX ORDER — PROPOFOL 10 MG/ML
5 INJECTION, EMULSION INTRAVENOUS
Qty: 1000 | Refills: 0 | Status: DISCONTINUED | OUTPATIENT
Start: 2017-02-23 | End: 2017-02-23

## 2017-02-23 RX ORDER — POTASSIUM CHLORIDE 20 MEQ
40 PACKET (EA) ORAL ONCE
Qty: 0 | Refills: 0 | Status: DISCONTINUED | OUTPATIENT
Start: 2017-02-23 | End: 2017-02-23

## 2017-02-23 RX ORDER — POTASSIUM CHLORIDE 20 MEQ
40 PACKET (EA) ORAL ONCE
Qty: 0 | Refills: 0 | Status: COMPLETED | OUTPATIENT
Start: 2017-02-23 | End: 2017-02-23

## 2017-02-23 RX ORDER — WARFARIN SODIUM 2.5 MG/1
1 TABLET ORAL ONCE
Qty: 0 | Refills: 0 | Status: DISCONTINUED | OUTPATIENT
Start: 2017-02-23 | End: 2017-02-23

## 2017-02-23 RX ORDER — ETOMIDATE 2 MG/ML
20 INJECTION INTRAVENOUS ONCE
Qty: 0 | Refills: 0 | Status: COMPLETED | OUTPATIENT
Start: 2017-02-23 | End: 2017-02-23

## 2017-02-23 RX ORDER — VANCOMYCIN HCL 1 G
1000 VIAL (EA) INTRAVENOUS EVERY 24 HOURS
Qty: 0 | Refills: 0 | Status: DISCONTINUED | OUTPATIENT
Start: 2017-02-23 | End: 2017-02-25

## 2017-02-23 RX ORDER — LINEZOLID 600 MG/300ML
600 INJECTION, SOLUTION INTRAVENOUS ONCE
Qty: 0 | Refills: 0 | Status: COMPLETED | OUTPATIENT
Start: 2017-02-23 | End: 2017-02-23

## 2017-02-23 RX ORDER — NOREPINEPHRINE BITARTRATE/D5W 8 MG/250ML
0.09 PLASTIC BAG, INJECTION (ML) INTRAVENOUS
Qty: 16 | Refills: 0 | Status: DISCONTINUED | OUTPATIENT
Start: 2017-02-23 | End: 2017-02-24

## 2017-02-23 RX ORDER — SODIUM CHLORIDE 9 MG/ML
1000 INJECTION INTRAMUSCULAR; INTRAVENOUS; SUBCUTANEOUS
Qty: 0 | Refills: 0 | Status: DISCONTINUED | OUTPATIENT
Start: 2017-02-23 | End: 2017-02-23

## 2017-02-23 RX ORDER — LINEZOLID 600 MG/300ML
600 INJECTION, SOLUTION INTRAVENOUS EVERY 12 HOURS
Qty: 0 | Refills: 0 | Status: DISCONTINUED | OUTPATIENT
Start: 2017-02-23 | End: 2017-02-23

## 2017-02-23 RX ORDER — NOREPINEPHRINE BITARTRATE/D5W 8 MG/250ML
0.1 PLASTIC BAG, INJECTION (ML) INTRAVENOUS
Qty: 16 | Refills: 0 | Status: DISCONTINUED | OUTPATIENT
Start: 2017-02-23 | End: 2017-02-23

## 2017-02-23 RX ORDER — VANCOMYCIN HCL 1 G
VIAL (EA) INTRAVENOUS
Qty: 0 | Refills: 0 | Status: DISCONTINUED | OUTPATIENT
Start: 2017-02-23 | End: 2017-02-23

## 2017-02-23 RX ORDER — ASPIRIN/CALCIUM CARB/MAGNESIUM 324 MG
81 TABLET ORAL DAILY
Qty: 0 | Refills: 0 | Status: DISCONTINUED | OUTPATIENT
Start: 2017-02-23 | End: 2017-03-08

## 2017-02-23 RX ORDER — FENTANYL CITRATE 50 UG/ML
50 INJECTION INTRAVENOUS
Qty: 0 | Refills: 0 | Status: DISCONTINUED | OUTPATIENT
Start: 2017-02-23 | End: 2017-02-24

## 2017-02-23 RX ORDER — FUROSEMIDE 40 MG
40 TABLET ORAL ONCE
Qty: 0 | Refills: 0 | Status: COMPLETED | OUTPATIENT
Start: 2017-02-23 | End: 2017-02-23

## 2017-02-23 RX ADMIN — ALBUTEROL 2.5 MILLIGRAM(S): 90 AEROSOL, METERED ORAL at 07:59

## 2017-02-23 RX ADMIN — NYSTATIN CREAM 1 APPLICATION(S): 100000 CREAM TOPICAL at 14:34

## 2017-02-23 RX ADMIN — SODIUM CHLORIDE 75 MILLILITER(S): 9 INJECTION INTRAMUSCULAR; INTRAVENOUS; SUBCUTANEOUS at 03:00

## 2017-02-23 RX ADMIN — ETOMIDATE 20 MILLIGRAM(S): 2 INJECTION INTRAVENOUS at 00:06

## 2017-02-23 RX ADMIN — Medication 6.21 MICROGRAM(S)/KG/MIN: at 00:36

## 2017-02-23 RX ADMIN — PIPERACILLIN AND TAZOBACTAM 25 GRAM(S): 4; .5 INJECTION, POWDER, LYOPHILIZED, FOR SOLUTION INTRAVENOUS at 05:24

## 2017-02-23 RX ADMIN — Medication 250 MILLIGRAM(S): at 17:57

## 2017-02-23 RX ADMIN — ALBUTEROL 2.5 MILLIGRAM(S): 90 AEROSOL, METERED ORAL at 19:27

## 2017-02-23 RX ADMIN — SODIUM CHLORIDE 75 MILLILITER(S): 9 INJECTION INTRAMUSCULAR; INTRAVENOUS; SUBCUTANEOUS at 04:00

## 2017-02-23 RX ADMIN — CHLORHEXIDINE GLUCONATE 15 MILLILITER(S): 213 SOLUTION TOPICAL at 17:57

## 2017-02-23 RX ADMIN — FENTANYL CITRATE 50 MICROGRAM(S): 50 INJECTION INTRAVENOUS at 22:26

## 2017-02-23 RX ADMIN — Medication 3 MILLILITER(S): at 08:00

## 2017-02-23 RX ADMIN — Medication 3 MILLILITER(S): at 19:28

## 2017-02-23 RX ADMIN — ALBUTEROL 2.5 MILLIGRAM(S): 90 AEROSOL, METERED ORAL at 14:52

## 2017-02-23 RX ADMIN — Medication 81 MILLIGRAM(S): at 13:10

## 2017-02-23 RX ADMIN — PIPERACILLIN AND TAZOBACTAM 25 GRAM(S): 4; .5 INJECTION, POWDER, LYOPHILIZED, FOR SOLUTION INTRAVENOUS at 21:19

## 2017-02-23 RX ADMIN — NYSTATIN CREAM 1 APPLICATION(S): 100000 CREAM TOPICAL at 21:18

## 2017-02-23 RX ADMIN — PROPOFOL 1.99 MICROGRAM(S)/KG/MIN: 10 INJECTION, EMULSION INTRAVENOUS at 02:00

## 2017-02-23 RX ADMIN — FENTANYL CITRATE 50 MICROGRAM(S): 50 INJECTION INTRAVENOUS at 23:30

## 2017-02-23 RX ADMIN — Medication 40 MILLIEQUIVALENT(S): at 13:10

## 2017-02-23 RX ADMIN — Medication 1 TABLET(S): at 17:57

## 2017-02-23 RX ADMIN — PANTOPRAZOLE SODIUM 40 MILLIGRAM(S): 20 TABLET, DELAYED RELEASE ORAL at 13:10

## 2017-02-23 RX ADMIN — SODIUM CHLORIDE 1000 MILLILITER(S): 9 INJECTION INTRAMUSCULAR; INTRAVENOUS; SUBCUTANEOUS at 04:00

## 2017-02-23 RX ADMIN — Medication 2 MILLIGRAM(S): at 00:06

## 2017-02-23 RX ADMIN — Medication 3 MILLILITER(S): at 01:04

## 2017-02-23 RX ADMIN — Medication 40 MILLIGRAM(S): at 13:09

## 2017-02-23 RX ADMIN — Medication 3 MILLILITER(S): at 14:53

## 2017-02-23 RX ADMIN — ALBUTEROL 2.5 MILLIGRAM(S): 90 AEROSOL, METERED ORAL at 01:04

## 2017-02-23 RX ADMIN — PIPERACILLIN AND TAZOBACTAM 25 GRAM(S): 4; .5 INJECTION, POWDER, LYOPHILIZED, FOR SOLUTION INTRAVENOUS at 14:34

## 2017-02-23 RX ADMIN — CHLORHEXIDINE GLUCONATE 15 MILLILITER(S): 213 SOLUTION TOPICAL at 05:25

## 2017-02-23 RX ADMIN — LINEZOLID 300 MILLIGRAM(S): 600 INJECTION, SOLUTION INTRAVENOUS at 05:10

## 2017-02-23 NOTE — PROVIDER CONTACT NOTE (EICU) - RECOMMENDATIONS
admit to ICU  mechanical ventilation  broad spectrum abx  Mucolytics  check cultures  ? Bronch   prognosis guarded/poor with extensive infiltrative disease

## 2017-02-23 NOTE — H&P ADULT. - ASSESSMENT
ALLERGY nka  CONTACT Radha   Self Same     REASON FOR VISIT Initial evaluation/Admission HNP/Pulmonary consultation requested  2/23/2017 by Dr Aranda from Dr Multani  Patient examined chart reviewed  HOSPITAL ADMISSION [facility] Dr Aranda   2/23/2017 5a afeb 90 120/90 21  2/23 7a NE .148 m/k/m  2/23/2017 7a Propofol 23 m/k/m   2/23/2017 NS 75  2/23 u 220 last s  2/23 ng 120 last s  2/23/2017 AC 22/400/100/12 PIP 32   2/23/2017 w 12.7 Hb 9.3 Plt 206  Na 140 K 3.6 CO2 29 Cr 1.2 G 179   2/22/2017 W 12.5 Hb 10.5 Plt 195k INR 2.7 Na 137 K 4.5 CO2 33 Cr 1.5  2/22 CK 37 Tr 1 n  2/22/2017 10p .5  740/58/33  ASSESSMENT  82 f from [facility] current everyday smoker HO COPD HO A fib on coumadin was admitted [facility] ICU 2/22/2017 with severe hypoxia and whiteout of left lung Denied chest pain In ER on 100% SaO2 91% 2/22 10p 100% 740/58/33   ACUTE SEVERE HYPOXEMIC RESP FAILURE INTUBATED 2/23 2/22/2017 10p .5  740/58/33 2/23/2017 6a AC 18/430/100%/8 732/56/80   PNEUMONIA 2/22-2/23 W 12.5-12.7 Linezolid 600.2 (2/23) Zosyn (2/22) Chlorhexidine .12% bid (2/22)  ATELECTASIS L LUNG POA Mucomyst (2/22) albuterol (2/22)  2/23 CXR Improved   A FIB COAGULATION PROFILE 2/22 RVP n 2/22-2/23 INR 2.7-2.8  FLYNN 2/22-2/23 Tr 1 n.2  JORDYN 2/22 Cr 1.5   DIABETES 2/23 g 179  SUP Protonix 40 (2/22) DIET Jevity 1.2 1080 (2/23)

## 2017-02-23 NOTE — PROVIDER CONTACT NOTE (EICU) - BACKGROUND
s/p CVP line placement , bedside team request CXR to be ordered  CXR ordered, bedside team to follow results.

## 2017-02-23 NOTE — DIETITIAN INITIAL EVALUATION ADULT. - NS AS NUTRI DX OTHER
Recommend initiation OGT feedings of Jevity 1.2 @ starting rate 20cc/hr & increase by 10cc q 8hrs until goal rate 60cc/hr x 24hrs. Recommend MVI daily. Consider SLP evaluation once extubated. Will remain available. Recommend initiation OGT feedings of Jevity 1.2 @ starting rate 20cc/hr & increase by 10cc q 6hrs until goal rate 60cc/hr x 24hrs. Recommend MVI daily, 500mg Vit C BID. Consider SLP evaluation once extubated. Rec B12/folate levels to assess. Will remain available.

## 2017-02-23 NOTE — DIETITIAN INITIAL EVALUATION ADULT. - OTHER INFO
Pt intubated/sedated at time of visit. Dx PNA. MD order for Jevity 1.2 via OGT @ starting rate 10cc/hr & advanced to 45cc/hr x 24hrs. Per RN plan to start tf; will monitor tolerance.  Pta pt from Health system on mechanical soft, MARCIA diet w/ thin liquids. +BM 2/23.

## 2017-02-23 NOTE — PROVIDER CONTACT NOTE (EICU) - ASSESSMENT
Acute hypoxic resp failure due to b.l pna L>R associated with septic shock, ?post viral ? MRSA with total Left lung collapse/plug

## 2017-02-23 NOTE — H&P ADULT. - HISTORY OF PRESENT ILLNESS
ALLERGY nka  CONTACT Radha   Self Same     REASON FOR VISIT Initial evaluation/Admission HNP/Pulmonary consultation requested  2/23/2017 by Dr Aranda from Dr Multani  Patient examined chart reviewed  HOSPITAL ADMISSION [facility] Dr Aranda   2/23/2017 5a afeb 90 120/90 21  2/23 7a NE .148 m/k/m  2/23/2017 7a Propofol 23 m/k/m   2/23/2017 NS 75  2/23 u 220 last s  2/23 ng 120 last s  2/23/2017 AC 22/400/100/12 PIP 32   2/23/2017 w 12.7 Hb 9.3 Plt 206  Na 140 K 3.6 CO2 29 Cr 1.2 G 179   2/22/2017 W 12.5 Hb 10.5 Plt 195k INR 2.7 Na 137 K 4.5 CO2 33 Cr 1.5  2/22 CK 37 Tr 1 n  2/22/2017 10p .5  740/58/33  ASSESSMENT  82 f from [employer] [place] current everyday smoker HO COPD HO A fib on coumadin was admitted [facility] ICU 2/22/2017 with severe hypoxia and whiteout of left lung Denied chest pain In ER on 100% SaO2 91% 2/22 10p 100% 740/58/33   ACUTE SEVERE HYPOXEMIC RESP FAILURE INTUBATED 2/23 2/22/2017 10p .5  740/58/33 2/23/2017 6a AC 18/430/100%/8 732/56/80   PNEUMONIA 2/22-2/23 W 12.5-12.7 Linezolid 600.2 (2/23) Zosyn (2/22) Chlorhexidine .12% bid (2/22)  ATELECTASIS L LUNG POA Mucomyst (2/22) albuterol (2/22)  2/23 CXR Improved   A FIB COAGULATION PROFILE 2/22 RVP n 2/22-2/23 INR 2.7-2.8  FLYNN 2/22-2/23 Tr 1 n.2  JORDYN 2/22 Cr 1.5   DIABETES 2/23 g 179  SUP Protonix 40 (2/22) DIET Jevity 1.2 1080 (2/23)

## 2017-02-23 NOTE — PROVIDER CONTACT NOTE (EICU) - BACKGROUND
82 year old female with h/o dementia, COPD active smoker, Afib on Coumadin was sent in from NH for hypoxia. Intubated in ED, recent flu like symptoms. CXR reveal total left lung collapse.

## 2017-02-24 LAB
ALBUMIN SERPL ELPH-MCNC: 2.2 G/DL — LOW (ref 3.3–5)
ALP SERPL-CCNC: 65 U/L — SIGNIFICANT CHANGE UP (ref 40–120)
ALT FLD-CCNC: 18 U/L — SIGNIFICANT CHANGE UP (ref 12–78)
ANION GAP SERPL CALC-SCNC: 9 MMOL/L — SIGNIFICANT CHANGE UP (ref 5–17)
APTT BLD: 40.2 SEC — HIGH (ref 27.5–37.4)
AST SERPL-CCNC: 13 U/L — LOW (ref 15–37)
BASE EXCESS BLDA CALC-SCNC: 3.9 MMOL/L — HIGH (ref -2–2)
BASE EXCESS BLDA CALC-SCNC: 4.8 MMOL/L — HIGH (ref -2–2)
BASOPHILS # BLD AUTO: 0 K/UL — SIGNIFICANT CHANGE UP (ref 0–0.2)
BASOPHILS NFR BLD AUTO: 0.2 % — SIGNIFICANT CHANGE UP (ref 0–2)
BILIRUB SERPL-MCNC: 0.3 MG/DL — SIGNIFICANT CHANGE UP (ref 0.2–1.2)
BLOOD GAS COMMENTS ARTERIAL: SIGNIFICANT CHANGE UP
BLOOD GAS COMMENTS ARTERIAL: SIGNIFICANT CHANGE UP
BLOOD GAS COMMENTS: SIGNIFICANT CHANGE UP
BUN SERPL-MCNC: 25 MG/DL — HIGH (ref 7–23)
CALCIUM SERPL-MCNC: 7.9 MG/DL — LOW (ref 8.5–10.1)
CHLORIDE SERPL-SCNC: 99 MMOL/L — SIGNIFICANT CHANGE UP (ref 96–108)
CO2 SERPL-SCNC: 30 MMOL/L — SIGNIFICANT CHANGE UP (ref 22–31)
CREAT ?TM UR-MCNC: 64 MG/DL — SIGNIFICANT CHANGE UP
CREAT SERPL-MCNC: 1.4 MG/DL — HIGH (ref 0.5–1.3)
EOSINOPHIL # BLD AUTO: 0 K/UL — SIGNIFICANT CHANGE UP (ref 0–0.5)
EOSINOPHIL NFR BLD AUTO: 0 % — SIGNIFICANT CHANGE UP (ref 0–6)
GLUCOSE SERPL-MCNC: 186 MG/DL — HIGH (ref 70–99)
HCO3 BLDA-SCNC: 28 MMOL/L — HIGH (ref 23–27)
HCO3 BLDA-SCNC: 28 MMOL/L — SIGNIFICANT CHANGE UP (ref 21–29)
HCT VFR BLD CALC: 27 % — LOW (ref 34.5–45)
HGB BLD-MCNC: 8.4 G/DL — LOW (ref 11.5–15.5)
HOROWITZ INDEX BLDA+IHG-RTO: 35 — SIGNIFICANT CHANGE UP
HOROWITZ INDEX BLDA+IHG-RTO: 40 — SIGNIFICANT CHANGE UP
INR BLD: 3.94 RATIO — HIGH (ref 0.88–1.16)
LYMPHOCYTES # BLD AUTO: 0.3 K/UL — LOW (ref 1–3.3)
LYMPHOCYTES # BLD AUTO: 2.1 % — LOW (ref 13–44)
MAGNESIUM SERPL-MCNC: 1.9 MG/DL — SIGNIFICANT CHANGE UP (ref 1.8–2.4)
MCHC RBC-ENTMCNC: 31.1 GM/DL — LOW (ref 32–36)
MCHC RBC-ENTMCNC: 31.4 PG — SIGNIFICANT CHANGE UP (ref 27–34)
MCV RBC AUTO: 101.1 FL — HIGH (ref 80–100)
MONOCYTES # BLD AUTO: 0.6 K/UL — SIGNIFICANT CHANGE UP (ref 0–0.9)
MONOCYTES NFR BLD AUTO: 3.6 % — SIGNIFICANT CHANGE UP (ref 1–9)
NEUTROPHILS # BLD AUTO: 14.5 K/UL — HIGH (ref 1.8–7.4)
NEUTROPHILS NFR BLD AUTO: 94.1 % — HIGH (ref 43–77)
PCO2 BLDA: 49 MMHG — HIGH (ref 32–46)
PCO2 BLDA: 50 MMHG — HIGH (ref 32–46)
PH BLD: 7.39 — SIGNIFICANT CHANGE UP (ref 7.35–7.45)
PH BLDA: 7.38 — SIGNIFICANT CHANGE UP (ref 7.35–7.45)
PHOSPHATE SERPL-MCNC: 2.7 MG/DL — SIGNIFICANT CHANGE UP (ref 2.5–4.5)
PLATELET # BLD AUTO: 199 K/UL — SIGNIFICANT CHANGE UP (ref 150–400)
PO2 BLDA: 67 MMHG — LOW (ref 74–108)
PO2 BLDA: 68 MMHG — LOW (ref 74–108)
POTASSIUM SERPL-MCNC: 3.6 MMOL/L — SIGNIFICANT CHANGE UP (ref 3.5–5.3)
POTASSIUM SERPL-SCNC: 3.6 MMOL/L — SIGNIFICANT CHANGE UP (ref 3.5–5.3)
PROT SERPL-MCNC: 5.8 G/DL — LOW (ref 6–8.3)
PROTHROM AB SERPL-ACNC: 44.3 SEC — HIGH (ref 10–13.1)
RBC # BLD: 2.68 M/UL — LOW (ref 3.8–5.2)
RBC # FLD: 20 % — HIGH (ref 10.3–14.5)
SAO2 % BLDA: 93 % — SIGNIFICANT CHANGE UP (ref 92–96)
SAO2 % BLDA: 93 % — SIGNIFICANT CHANGE UP (ref 92–96)
SODIUM SERPL-SCNC: 138 MMOL/L — SIGNIFICANT CHANGE UP (ref 135–145)
SODIUM UR-SCNC: 25 MMOL/L — SIGNIFICANT CHANGE UP
UUN UR-MCNC: 670 MG/DL — SIGNIFICANT CHANGE UP
VANCOMYCIN TROUGH SERPL-MCNC: 15 UG/ML — SIGNIFICANT CHANGE UP (ref 10–20)
WBC # BLD: 15.4 K/UL — HIGH (ref 3.8–10.5)
WBC # FLD AUTO: 15.4 K/UL — HIGH (ref 3.8–10.5)

## 2017-02-24 PROCEDURE — 99291 CRITICAL CARE FIRST HOUR: CPT

## 2017-02-24 RX ORDER — NOREPINEPHRINE BITARTRATE/D5W 8 MG/250ML
0.15 PLASTIC BAG, INJECTION (ML) INTRAVENOUS
Qty: 16 | Refills: 0 | Status: DISCONTINUED | OUTPATIENT
Start: 2017-02-24 | End: 2017-02-25

## 2017-02-24 RX ORDER — MAGNESIUM SULFATE 500 MG/ML
2 VIAL (ML) INJECTION ONCE
Qty: 0 | Refills: 0 | Status: COMPLETED | OUTPATIENT
Start: 2017-02-24 | End: 2017-02-24

## 2017-02-24 RX ORDER — POTASSIUM CHLORIDE 20 MEQ
40 PACKET (EA) ORAL ONCE
Qty: 0 | Refills: 0 | Status: COMPLETED | OUTPATIENT
Start: 2017-02-24 | End: 2017-02-24

## 2017-02-24 RX ORDER — FUROSEMIDE 40 MG
40 TABLET ORAL ONCE
Qty: 0 | Refills: 0 | Status: COMPLETED | OUTPATIENT
Start: 2017-02-24 | End: 2017-02-24

## 2017-02-24 RX ORDER — BUDESONIDE, MICRONIZED 100 %
0.5 POWDER (GRAM) MISCELLANEOUS
Qty: 0 | Refills: 0 | Status: DISCONTINUED | OUTPATIENT
Start: 2017-02-24 | End: 2017-03-08

## 2017-02-24 RX ORDER — IPRATROPIUM/ALBUTEROL SULFATE 18-103MCG
3 AEROSOL WITH ADAPTER (GRAM) INHALATION EVERY 6 HOURS
Qty: 0 | Refills: 0 | Status: DISCONTINUED | OUTPATIENT
Start: 2017-02-24 | End: 2017-03-04

## 2017-02-24 RX ADMIN — Medication 40 MILLIGRAM(S): at 11:02

## 2017-02-24 RX ADMIN — Medication 250 MILLIGRAM(S): at 17:57

## 2017-02-24 RX ADMIN — CHLORHEXIDINE GLUCONATE 15 MILLILITER(S): 213 SOLUTION TOPICAL at 05:21

## 2017-02-24 RX ADMIN — NYSTATIN CREAM 1 APPLICATION(S): 100000 CREAM TOPICAL at 13:49

## 2017-02-24 RX ADMIN — NYSTATIN CREAM 1 APPLICATION(S): 100000 CREAM TOPICAL at 05:21

## 2017-02-24 RX ADMIN — Medication 0.5 MILLIGRAM(S): at 19:23

## 2017-02-24 RX ADMIN — Medication 1 TABLET(S): at 11:46

## 2017-02-24 RX ADMIN — Medication 81 MILLIGRAM(S): at 11:46

## 2017-02-24 RX ADMIN — Medication 3 MILLILITER(S): at 19:23

## 2017-02-24 RX ADMIN — PIPERACILLIN AND TAZOBACTAM 25 GRAM(S): 4; .5 INJECTION, POWDER, LYOPHILIZED, FOR SOLUTION INTRAVENOUS at 05:21

## 2017-02-24 RX ADMIN — NYSTATIN CREAM 1 APPLICATION(S): 100000 CREAM TOPICAL at 21:23

## 2017-02-24 RX ADMIN — ALBUTEROL 2.5 MILLIGRAM(S): 90 AEROSOL, METERED ORAL at 01:56

## 2017-02-24 RX ADMIN — PANTOPRAZOLE SODIUM 40 MILLIGRAM(S): 20 TABLET, DELAYED RELEASE ORAL at 11:46

## 2017-02-24 RX ADMIN — Medication 3 MILLILITER(S): at 01:56

## 2017-02-24 RX ADMIN — Medication 40 MILLIEQUIVALENT(S): at 11:02

## 2017-02-24 RX ADMIN — CHLORHEXIDINE GLUCONATE 15 MILLILITER(S): 213 SOLUTION TOPICAL at 17:57

## 2017-02-24 RX ADMIN — PIPERACILLIN AND TAZOBACTAM 25 GRAM(S): 4; .5 INJECTION, POWDER, LYOPHILIZED, FOR SOLUTION INTRAVENOUS at 13:49

## 2017-02-24 RX ADMIN — Medication 50 GRAM(S): at 11:02

## 2017-02-24 RX ADMIN — ALBUTEROL 2.5 MILLIGRAM(S): 90 AEROSOL, METERED ORAL at 14:38

## 2017-02-24 RX ADMIN — Medication 3 MILLILITER(S): at 14:38

## 2017-02-24 RX ADMIN — PIPERACILLIN AND TAZOBACTAM 25 GRAM(S): 4; .5 INJECTION, POWDER, LYOPHILIZED, FOR SOLUTION INTRAVENOUS at 21:23

## 2017-02-24 RX ADMIN — ALBUTEROL 2.5 MILLIGRAM(S): 90 AEROSOL, METERED ORAL at 07:46

## 2017-02-24 RX ADMIN — Medication 10 MICROGRAM(S)/KG/MIN: at 23:04

## 2017-02-24 RX ADMIN — Medication 3 MILLILITER(S): at 07:46

## 2017-02-24 RX ADMIN — Medication 3 MILLILITER(S): at 19:25

## 2017-02-25 LAB
-  AMPICILLIN/SULBACTAM: SIGNIFICANT CHANGE UP
-  CEFAZOLIN: SIGNIFICANT CHANGE UP
-  CIPROFLOXACIN: SIGNIFICANT CHANGE UP
-  CLINDAMYCIN: SIGNIFICANT CHANGE UP
-  ERYTHROMYCIN: SIGNIFICANT CHANGE UP
-  GENTAMICIN: SIGNIFICANT CHANGE UP
-  LEVOFLOXACIN: SIGNIFICANT CHANGE UP
-  LINEZOLID: SIGNIFICANT CHANGE UP
-  MOXIFLOXACIN(AEROBIC): SIGNIFICANT CHANGE UP
-  OXACILLIN: SIGNIFICANT CHANGE UP
-  PENICILLIN: SIGNIFICANT CHANGE UP
-  RIFAMPIN: SIGNIFICANT CHANGE UP
-  TETRACYCLINE: SIGNIFICANT CHANGE UP
-  TRIMETHOPRIM/SULFAMETHOXAZOLE: SIGNIFICANT CHANGE UP
-  VANCOMYCIN: SIGNIFICANT CHANGE UP
ALBUMIN SERPL ELPH-MCNC: 2.4 G/DL — LOW (ref 3.3–5)
ALP SERPL-CCNC: 64 U/L — SIGNIFICANT CHANGE UP (ref 40–120)
ALT FLD-CCNC: 18 U/L — SIGNIFICANT CHANGE UP (ref 12–78)
ANION GAP SERPL CALC-SCNC: 9 MMOL/L — SIGNIFICANT CHANGE UP (ref 5–17)
APTT BLD: 41.9 SEC — HIGH (ref 27.5–37.4)
AST SERPL-CCNC: 12 U/L — LOW (ref 15–37)
BILIRUB SERPL-MCNC: 0.4 MG/DL — SIGNIFICANT CHANGE UP (ref 0.2–1.2)
BUN SERPL-MCNC: 25 MG/DL — HIGH (ref 7–23)
CALCIUM SERPL-MCNC: 8.4 MG/DL — LOW (ref 8.5–10.1)
CHLORIDE SERPL-SCNC: 98 MMOL/L — SIGNIFICANT CHANGE UP (ref 96–108)
CO2 SERPL-SCNC: 34 MMOL/L — HIGH (ref 22–31)
CREAT SERPL-MCNC: 1.4 MG/DL — HIGH (ref 0.5–1.3)
CULTURE RESULTS: SIGNIFICANT CHANGE UP
GLUCOSE SERPL-MCNC: 104 MG/DL — HIGH (ref 70–99)
HCT VFR BLD CALC: 26.8 % — LOW (ref 34.5–45)
HGB BLD-MCNC: 8.3 G/DL — LOW (ref 11.5–15.5)
INR BLD: 3.17 RATIO — HIGH (ref 0.88–1.16)
LEGIONELLA AG UR QL: NEGATIVE — SIGNIFICANT CHANGE UP
MAGNESIUM SERPL-MCNC: 2.4 MG/DL — SIGNIFICANT CHANGE UP (ref 1.8–2.4)
MCHC RBC-ENTMCNC: 31.1 GM/DL — LOW (ref 32–36)
MCHC RBC-ENTMCNC: 31.5 PG — SIGNIFICANT CHANGE UP (ref 27–34)
MCV RBC AUTO: 101.3 FL — HIGH (ref 80–100)
METHOD TYPE: SIGNIFICANT CHANGE UP
ORGANISM # SPEC MICROSCOPIC CNT: SIGNIFICANT CHANGE UP
ORGANISM # SPEC MICROSCOPIC CNT: SIGNIFICANT CHANGE UP
PHOSPHATE SERPL-MCNC: 3.2 MG/DL — SIGNIFICANT CHANGE UP (ref 2.5–4.5)
PLATELET # BLD AUTO: 202 K/UL — SIGNIFICANT CHANGE UP (ref 150–400)
POTASSIUM SERPL-MCNC: 3.6 MMOL/L — SIGNIFICANT CHANGE UP (ref 3.5–5.3)
POTASSIUM SERPL-SCNC: 3.6 MMOL/L — SIGNIFICANT CHANGE UP (ref 3.5–5.3)
PROT SERPL-MCNC: 6 G/DL — SIGNIFICANT CHANGE UP (ref 6–8.3)
PROTHROM AB SERPL-ACNC: 35.6 SEC — HIGH (ref 10–13.1)
RBC # BLD: 2.64 M/UL — LOW (ref 3.8–5.2)
RBC # FLD: 19.8 % — HIGH (ref 10.3–14.5)
SODIUM SERPL-SCNC: 141 MMOL/L — SIGNIFICANT CHANGE UP (ref 135–145)
SPECIMEN SOURCE: SIGNIFICANT CHANGE UP
VANCOMYCIN TROUGH SERPL-MCNC: 20 UG/ML — SIGNIFICANT CHANGE UP (ref 10–20)
WBC # BLD: 15.3 K/UL — HIGH (ref 3.8–10.5)
WBC # FLD AUTO: 15.3 K/UL — HIGH (ref 3.8–10.5)

## 2017-02-25 PROCEDURE — 71010: CPT | Mod: 26

## 2017-02-25 PROCEDURE — 99291 CRITICAL CARE FIRST HOUR: CPT

## 2017-02-25 RX ORDER — CEFEPIME 1 G/1
2000 INJECTION, POWDER, FOR SOLUTION INTRAMUSCULAR; INTRAVENOUS ONCE
Qty: 0 | Refills: 0 | Status: COMPLETED | OUTPATIENT
Start: 2017-02-25 | End: 2017-02-25

## 2017-02-25 RX ORDER — MIDODRINE HYDROCHLORIDE 2.5 MG/1
10 TABLET ORAL EVERY 8 HOURS
Qty: 0 | Refills: 0 | Status: DISCONTINUED | OUTPATIENT
Start: 2017-02-25 | End: 2017-02-28

## 2017-02-25 RX ORDER — CEFEPIME 1 G/1
2000 INJECTION, POWDER, FOR SOLUTION INTRAMUSCULAR; INTRAVENOUS EVERY 24 HOURS
Qty: 0 | Refills: 0 | Status: COMPLETED | OUTPATIENT
Start: 2017-02-26 | End: 2017-02-26

## 2017-02-25 RX ORDER — VANCOMYCIN HCL 1 G
VIAL (EA) INTRAVENOUS
Qty: 0 | Refills: 0 | Status: DISCONTINUED | OUTPATIENT
Start: 2017-02-25 | End: 2017-02-25

## 2017-02-25 RX ORDER — CEFEPIME 1 G/1
INJECTION, POWDER, FOR SOLUTION INTRAMUSCULAR; INTRAVENOUS
Qty: 0 | Refills: 0 | Status: COMPLETED | OUTPATIENT
Start: 2017-02-25 | End: 2017-02-26

## 2017-02-25 RX ORDER — VANCOMYCIN HCL 1 G
750 VIAL (EA) INTRAVENOUS ONCE
Qty: 0 | Refills: 0 | Status: COMPLETED | OUTPATIENT
Start: 2017-02-25 | End: 2017-02-25

## 2017-02-25 RX ORDER — FUROSEMIDE 40 MG
40 TABLET ORAL ONCE
Qty: 0 | Refills: 0 | Status: COMPLETED | OUTPATIENT
Start: 2017-02-25 | End: 2017-02-25

## 2017-02-25 RX ORDER — PANTOPRAZOLE SODIUM 20 MG/1
40 TABLET, DELAYED RELEASE ORAL
Qty: 0 | Refills: 0 | Status: DISCONTINUED | OUTPATIENT
Start: 2017-02-26 | End: 2017-03-08

## 2017-02-25 RX ADMIN — PANTOPRAZOLE SODIUM 40 MILLIGRAM(S): 20 TABLET, DELAYED RELEASE ORAL at 11:29

## 2017-02-25 RX ADMIN — Medication 150 MILLIGRAM(S): at 21:17

## 2017-02-25 RX ADMIN — MIDODRINE HYDROCHLORIDE 10 MILLIGRAM(S): 2.5 TABLET ORAL at 10:39

## 2017-02-25 RX ADMIN — Medication 1 TABLET(S): at 11:29

## 2017-02-25 RX ADMIN — Medication 0.5 MILLIGRAM(S): at 07:48

## 2017-02-25 RX ADMIN — NYSTATIN CREAM 1 APPLICATION(S): 100000 CREAM TOPICAL at 05:35

## 2017-02-25 RX ADMIN — Medication 3 MILLILITER(S): at 07:52

## 2017-02-25 RX ADMIN — Medication 0.5 MILLIGRAM(S): at 20:21

## 2017-02-25 RX ADMIN — CEFEPIME 100 MILLIGRAM(S): 1 INJECTION, POWDER, FOR SOLUTION INTRAMUSCULAR; INTRAVENOUS at 14:50

## 2017-02-25 RX ADMIN — MIDODRINE HYDROCHLORIDE 10 MILLIGRAM(S): 2.5 TABLET ORAL at 21:18

## 2017-02-25 RX ADMIN — CHLORHEXIDINE GLUCONATE 15 MILLILITER(S): 213 SOLUTION TOPICAL at 17:30

## 2017-02-25 RX ADMIN — Medication 3 MILLILITER(S): at 01:41

## 2017-02-25 RX ADMIN — Medication 3 MILLILITER(S): at 20:21

## 2017-02-25 RX ADMIN — NYSTATIN CREAM 1 APPLICATION(S): 100000 CREAM TOPICAL at 21:18

## 2017-02-25 RX ADMIN — Medication 40 MILLIGRAM(S): at 12:37

## 2017-02-25 RX ADMIN — Medication 3 MILLILITER(S): at 07:48

## 2017-02-25 RX ADMIN — MIDODRINE HYDROCHLORIDE 10 MILLIGRAM(S): 2.5 TABLET ORAL at 13:59

## 2017-02-25 RX ADMIN — Medication 81 MILLIGRAM(S): at 11:29

## 2017-02-25 RX ADMIN — PIPERACILLIN AND TAZOBACTAM 25 GRAM(S): 4; .5 INJECTION, POWDER, LYOPHILIZED, FOR SOLUTION INTRAVENOUS at 05:35

## 2017-02-25 RX ADMIN — Medication 3 MILLILITER(S): at 14:18

## 2017-02-25 RX ADMIN — Medication 3 MILLILITER(S): at 01:43

## 2017-02-25 RX ADMIN — NYSTATIN CREAM 1 APPLICATION(S): 100000 CREAM TOPICAL at 13:59

## 2017-02-25 NOTE — SWALLOW BEDSIDE ASSESSMENT ADULT - COMMENTS
Pt alert, oriented, cooperative.  Pt extubated yesterday.  Pt presents with oral dysphagia characterized by labored formation of the bolus, latent AP transport, timely swallow.  No overt s/s of aspiration.  Pt presents with incomplete dentition.  She has upper dentures h/e she refused to wear them for assessment.

## 2017-02-25 NOTE — SWALLOW BEDSIDE ASSESSMENT ADULT - ORAL PHASE
Within functional limits Delayed oral transit time/Decreased anterior-posterior movement of the bolus

## 2017-02-26 LAB
ANION GAP SERPL CALC-SCNC: 8 MMOL/L — SIGNIFICANT CHANGE UP (ref 5–17)
APTT BLD: 42.2 SEC — HIGH (ref 27.5–37.4)
BASE EXCESS BLDA CALC-SCNC: 16.8 MMOL/L — HIGH (ref -2–2)
BASOPHILS # BLD AUTO: 0 K/UL — SIGNIFICANT CHANGE UP (ref 0–0.2)
BASOPHILS NFR BLD AUTO: 0.5 % — SIGNIFICANT CHANGE UP (ref 0–2)
BLOOD GAS COMMENTS ARTERIAL: SIGNIFICANT CHANGE UP
BLOOD GAS COMMENTS ARTERIAL: SIGNIFICANT CHANGE UP
BUN SERPL-MCNC: 23 MG/DL — SIGNIFICANT CHANGE UP (ref 7–23)
CALCIUM SERPL-MCNC: 8.1 MG/DL — LOW (ref 8.5–10.1)
CHLORIDE SERPL-SCNC: 94 MMOL/L — LOW (ref 96–108)
CO2 SERPL-SCNC: 38 MMOL/L — HIGH (ref 22–31)
CREAT SERPL-MCNC: 1.3 MG/DL — SIGNIFICANT CHANGE UP (ref 0.5–1.3)
EOSINOPHIL # BLD AUTO: 0.4 K/UL — SIGNIFICANT CHANGE UP (ref 0–0.5)
EOSINOPHIL NFR BLD AUTO: 3.9 % — SIGNIFICANT CHANGE UP (ref 0–6)
GLUCOSE SERPL-MCNC: 72 MG/DL — SIGNIFICANT CHANGE UP (ref 70–99)
HCO3 BLDA-SCNC: 40 MMOL/L — HIGH (ref 23–27)
HCT VFR BLD CALC: 27.5 % — LOW (ref 34.5–45)
HGB BLD-MCNC: 8.6 G/DL — LOW (ref 11.5–15.5)
HOROWITZ INDEX BLDA+IHG-RTO: 100 — SIGNIFICANT CHANGE UP
INR BLD: 2.76 RATIO — HIGH (ref 0.88–1.16)
LYMPHOCYTES # BLD AUTO: 0.9 K/UL — LOW (ref 1–3.3)
LYMPHOCYTES # BLD AUTO: 10.3 % — LOW (ref 13–44)
MAGNESIUM SERPL-MCNC: 2.1 MG/DL — SIGNIFICANT CHANGE UP (ref 1.8–2.4)
MCHC RBC-ENTMCNC: 31.2 GM/DL — LOW (ref 32–36)
MCHC RBC-ENTMCNC: 31.4 PG — SIGNIFICANT CHANGE UP (ref 27–34)
MCV RBC AUTO: 100.9 FL — HIGH (ref 80–100)
MONOCYTES # BLD AUTO: 0.4 K/UL — SIGNIFICANT CHANGE UP (ref 0–0.9)
MONOCYTES NFR BLD AUTO: 4.2 % — SIGNIFICANT CHANGE UP (ref 1–9)
NEUTROPHILS # BLD AUTO: 7.4 K/UL — SIGNIFICANT CHANGE UP (ref 1.8–7.4)
NEUTROPHILS NFR BLD AUTO: 81.1 % — HIGH (ref 43–77)
PCO2 BLDA: 62 MMHG — HIGH (ref 32–46)
PH BLDA: 7.44 — SIGNIFICANT CHANGE UP (ref 7.35–7.45)
PHOSPHATE SERPL-MCNC: 2.6 MG/DL — SIGNIFICANT CHANGE UP (ref 2.5–4.5)
PLATELET # BLD AUTO: 188 K/UL — SIGNIFICANT CHANGE UP (ref 150–400)
PO2 BLDA: 66 MMHG — LOW (ref 74–108)
POTASSIUM SERPL-MCNC: 3.4 MMOL/L — LOW (ref 3.5–5.3)
POTASSIUM SERPL-SCNC: 3.4 MMOL/L — LOW (ref 3.5–5.3)
PROTHROM AB SERPL-ACNC: 31 SEC — HIGH (ref 10–13.1)
RBC # BLD: 2.72 M/UL — LOW (ref 3.8–5.2)
RBC # FLD: 19.8 % — HIGH (ref 10.3–14.5)
SAO2 % BLDA: 93 % — SIGNIFICANT CHANGE UP (ref 92–96)
SODIUM SERPL-SCNC: 140 MMOL/L — SIGNIFICANT CHANGE UP (ref 135–145)
VANCOMYCIN TROUGH SERPL-MCNC: 18.8 UG/ML — SIGNIFICANT CHANGE UP (ref 10–20)
WBC # BLD: 9.1 K/UL — SIGNIFICANT CHANGE UP (ref 3.8–10.5)
WBC # FLD AUTO: 9.1 K/UL — SIGNIFICANT CHANGE UP (ref 3.8–10.5)

## 2017-02-26 PROCEDURE — 71010: CPT | Mod: 26

## 2017-02-26 PROCEDURE — 99291 CRITICAL CARE FIRST HOUR: CPT

## 2017-02-26 RX ORDER — METOPROLOL TARTRATE 50 MG
5 TABLET ORAL ONCE
Qty: 0 | Refills: 0 | Status: COMPLETED | OUTPATIENT
Start: 2017-02-26 | End: 2017-02-26

## 2017-02-26 RX ORDER — VANCOMYCIN HCL 1 G
750 VIAL (EA) INTRAVENOUS EVERY 24 HOURS
Qty: 0 | Refills: 0 | Status: DISCONTINUED | OUTPATIENT
Start: 2017-02-26 | End: 2017-03-01

## 2017-02-26 RX ORDER — FUROSEMIDE 40 MG
40 TABLET ORAL DAILY
Qty: 0 | Refills: 0 | Status: DISCONTINUED | OUTPATIENT
Start: 2017-02-26 | End: 2017-03-08

## 2017-02-26 RX ORDER — FUROSEMIDE 40 MG
40 TABLET ORAL ONCE
Qty: 0 | Refills: 0 | Status: COMPLETED | OUTPATIENT
Start: 2017-02-26 | End: 2017-02-26

## 2017-02-26 RX ORDER — WARFARIN SODIUM 2.5 MG/1
1 TABLET ORAL ONCE
Qty: 0 | Refills: 0 | Status: COMPLETED | OUTPATIENT
Start: 2017-02-26 | End: 2017-02-26

## 2017-02-26 RX ORDER — POTASSIUM CHLORIDE 20 MEQ
40 PACKET (EA) ORAL EVERY 4 HOURS
Qty: 0 | Refills: 0 | Status: COMPLETED | OUTPATIENT
Start: 2017-02-26 | End: 2017-02-26

## 2017-02-26 RX ADMIN — Medication 40 MILLIEQUIVALENT(S): at 08:50

## 2017-02-26 RX ADMIN — PANTOPRAZOLE SODIUM 40 MILLIGRAM(S): 20 TABLET, DELAYED RELEASE ORAL at 08:50

## 2017-02-26 RX ADMIN — Medication 3 MILLILITER(S): at 15:03

## 2017-02-26 RX ADMIN — Medication 3 MILLILITER(S): at 02:24

## 2017-02-26 RX ADMIN — MIDODRINE HYDROCHLORIDE 10 MILLIGRAM(S): 2.5 TABLET ORAL at 13:56

## 2017-02-26 RX ADMIN — MIDODRINE HYDROCHLORIDE 10 MILLIGRAM(S): 2.5 TABLET ORAL at 06:50

## 2017-02-26 RX ADMIN — NYSTATIN CREAM 1 APPLICATION(S): 100000 CREAM TOPICAL at 13:56

## 2017-02-26 RX ADMIN — CEFEPIME 100 MILLIGRAM(S): 1 INJECTION, POWDER, FOR SOLUTION INTRAMUSCULAR; INTRAVENOUS at 13:56

## 2017-02-26 RX ADMIN — CHLORHEXIDINE GLUCONATE 15 MILLILITER(S): 213 SOLUTION TOPICAL at 18:09

## 2017-02-26 RX ADMIN — Medication 3 MILLILITER(S): at 08:20

## 2017-02-26 RX ADMIN — Medication 40 MILLIGRAM(S): at 08:50

## 2017-02-26 RX ADMIN — MIDODRINE HYDROCHLORIDE 10 MILLIGRAM(S): 2.5 TABLET ORAL at 21:48

## 2017-02-26 RX ADMIN — WARFARIN SODIUM 1 MILLIGRAM(S): 2.5 TABLET ORAL at 21:48

## 2017-02-26 RX ADMIN — Medication 40 MILLIGRAM(S): at 20:27

## 2017-02-26 RX ADMIN — Medication 40 MILLIGRAM(S): at 19:23

## 2017-02-26 RX ADMIN — NYSTATIN CREAM 1 APPLICATION(S): 100000 CREAM TOPICAL at 06:50

## 2017-02-26 RX ADMIN — Medication 3 MILLILITER(S): at 08:24

## 2017-02-26 RX ADMIN — Medication 81 MILLIGRAM(S): at 11:41

## 2017-02-26 RX ADMIN — Medication 1 TABLET(S): at 11:41

## 2017-02-26 RX ADMIN — Medication 3 MILLILITER(S): at 19:57

## 2017-02-26 RX ADMIN — Medication 5 MILLIGRAM(S): at 13:29

## 2017-02-26 RX ADMIN — NYSTATIN CREAM 1 APPLICATION(S): 100000 CREAM TOPICAL at 21:49

## 2017-02-26 RX ADMIN — Medication 0.5 MILLIGRAM(S): at 08:20

## 2017-02-26 RX ADMIN — Medication 3 MILLILITER(S): at 02:25

## 2017-02-26 RX ADMIN — Medication 40 MILLIEQUIVALENT(S): at 11:41

## 2017-02-26 RX ADMIN — Medication 0.5 MILLIGRAM(S): at 19:57

## 2017-02-26 RX ADMIN — Medication 150 MILLIGRAM(S): at 22:11

## 2017-02-26 NOTE — PHYSICAL THERAPY INITIAL EVALUATION ADULT - CRITERIA FOR SKILLED THERAPEUTIC INTERVENTIONS
anticipated discharge recommendation/anticipated equipment needs at discharge/risk reduction/prevention

## 2017-02-26 NOTE — PHYSICAL THERAPY INITIAL EVALUATION ADULT - ADDITIONAL COMMENTS
Pt lives with her sister and nieces. There is a ramp to enter home and no stairs once inside.Pt is a current every day smoker. No home 02

## 2017-02-26 NOTE — PHYSICAL THERAPY INITIAL EVALUATION ADULT - GENERAL OBSERVATIONS, REHAB EVAL
Pt presents in ICU bed in semi-Carrillo position ,telemetry, Phipps catheter and 02, 5 liters via nc intact

## 2017-02-26 NOTE — PHYSICAL THERAPY INITIAL EVALUATION ADULT - PERTINENT HX OF CURRENT PROBLEM, REHAB EVAL
Pt with PMH of anemia, a-fib, COPD, GERD, HTN, PNA pressure ulcer stage II on buttock and polyneuropathy. Admitted with respiratory distress. Pt was intubated 2/22 and was extubated on 2/24

## 2017-02-27 LAB
ANION GAP SERPL CALC-SCNC: 8 MMOL/L — SIGNIFICANT CHANGE UP (ref 5–17)
APTT BLD: 39.2 SEC — HIGH (ref 27.5–37.4)
BASOPHILS # BLD AUTO: 0 K/UL — SIGNIFICANT CHANGE UP (ref 0–0.2)
BASOPHILS NFR BLD AUTO: 0.3 % — SIGNIFICANT CHANGE UP (ref 0–2)
BUN SERPL-MCNC: 23 MG/DL — SIGNIFICANT CHANGE UP (ref 7–23)
CALCIUM SERPL-MCNC: 8.8 MG/DL — SIGNIFICANT CHANGE UP (ref 8.5–10.1)
CHLORIDE SERPL-SCNC: 87 MMOL/L — LOW (ref 96–108)
CO2 SERPL-SCNC: 43 MMOL/L — HIGH (ref 22–31)
CREAT SERPL-MCNC: 1.3 MG/DL — SIGNIFICANT CHANGE UP (ref 0.5–1.3)
EOSINOPHIL # BLD AUTO: 0 K/UL — SIGNIFICANT CHANGE UP (ref 0–0.5)
EOSINOPHIL NFR BLD AUTO: 0 % — SIGNIFICANT CHANGE UP (ref 0–6)
GLUCOSE SERPL-MCNC: 155 MG/DL — HIGH (ref 70–99)
HCT VFR BLD CALC: 30.9 % — LOW (ref 34.5–45)
HGB BLD-MCNC: 9.6 G/DL — LOW (ref 11.5–15.5)
INR BLD: 2.2 RATIO — HIGH (ref 0.88–1.16)
LYMPHOCYTES # BLD AUTO: 0.1 K/UL — LOW (ref 1–3.3)
LYMPHOCYTES # BLD AUTO: 1.1 % — LOW (ref 13–44)
MAGNESIUM SERPL-MCNC: 2 MG/DL — SIGNIFICANT CHANGE UP (ref 1.8–2.4)
MCHC RBC-ENTMCNC: 30.9 GM/DL — LOW (ref 32–36)
MCHC RBC-ENTMCNC: 31.5 PG — SIGNIFICANT CHANGE UP (ref 27–34)
MCV RBC AUTO: 101.9 FL — HIGH (ref 80–100)
MONOCYTES # BLD AUTO: 0.2 K/UL — SIGNIFICANT CHANGE UP (ref 0–0.9)
MONOCYTES NFR BLD AUTO: 1.8 % — SIGNIFICANT CHANGE UP (ref 1–9)
NEUTROPHILS # BLD AUTO: 11.1 K/UL — HIGH (ref 1.8–7.4)
NEUTROPHILS NFR BLD AUTO: 96.8 % — HIGH (ref 43–77)
PHOSPHATE SERPL-MCNC: 3.1 MG/DL — SIGNIFICANT CHANGE UP (ref 2.5–4.5)
PLATELET # BLD AUTO: 259 K/UL — SIGNIFICANT CHANGE UP (ref 150–400)
POTASSIUM SERPL-MCNC: 3.9 MMOL/L — SIGNIFICANT CHANGE UP (ref 3.5–5.3)
POTASSIUM SERPL-SCNC: 3.9 MMOL/L — SIGNIFICANT CHANGE UP (ref 3.5–5.3)
PROTHROM AB SERPL-ACNC: 24.6 SEC — HIGH (ref 10–13.1)
RBC # BLD: 3.03 M/UL — LOW (ref 3.8–5.2)
RBC # FLD: 20 % — HIGH (ref 10.3–14.5)
SODIUM SERPL-SCNC: 138 MMOL/L — SIGNIFICANT CHANGE UP (ref 135–145)
VANCOMYCIN TROUGH SERPL-MCNC: 25.9 UG/ML — CRITICAL HIGH (ref 10–20)
WBC # BLD: 11.5 K/UL — HIGH (ref 3.8–10.5)
WBC # FLD AUTO: 11.5 K/UL — HIGH (ref 3.8–10.5)

## 2017-02-27 PROCEDURE — 99291 CRITICAL CARE FIRST HOUR: CPT

## 2017-02-27 RX ORDER — NICOTINE POLACRILEX 2 MG
1 GUM BUCCAL DAILY
Qty: 0 | Refills: 0 | Status: DISCONTINUED | OUTPATIENT
Start: 2017-02-27 | End: 2017-03-08

## 2017-02-27 RX ORDER — WARFARIN SODIUM 2.5 MG/1
1 TABLET ORAL ONCE
Qty: 0 | Refills: 0 | Status: COMPLETED | OUTPATIENT
Start: 2017-02-27 | End: 2017-02-27

## 2017-02-27 RX ADMIN — Medication 3 MILLILITER(S): at 19:58

## 2017-02-27 RX ADMIN — Medication 1 PATCH: at 17:07

## 2017-02-27 RX ADMIN — Medication 3 MILLILITER(S): at 02:16

## 2017-02-27 RX ADMIN — Medication 3 MILLILITER(S): at 07:59

## 2017-02-27 RX ADMIN — NYSTATIN CREAM 1 APPLICATION(S): 100000 CREAM TOPICAL at 13:24

## 2017-02-27 RX ADMIN — Medication 81 MILLIGRAM(S): at 11:57

## 2017-02-27 RX ADMIN — Medication 40 MILLIGRAM(S): at 06:51

## 2017-02-27 RX ADMIN — MIDODRINE HYDROCHLORIDE 10 MILLIGRAM(S): 2.5 TABLET ORAL at 06:50

## 2017-02-27 RX ADMIN — Medication 3 MILLILITER(S): at 07:58

## 2017-02-27 RX ADMIN — Medication 1 TABLET(S): at 11:56

## 2017-02-27 RX ADMIN — Medication 3 MILLILITER(S): at 14:26

## 2017-02-27 RX ADMIN — MIDODRINE HYDROCHLORIDE 10 MILLIGRAM(S): 2.5 TABLET ORAL at 21:44

## 2017-02-27 RX ADMIN — Medication 0.5 MILLIGRAM(S): at 19:57

## 2017-02-27 RX ADMIN — NYSTATIN CREAM 1 APPLICATION(S): 100000 CREAM TOPICAL at 21:44

## 2017-02-27 RX ADMIN — MIDODRINE HYDROCHLORIDE 10 MILLIGRAM(S): 2.5 TABLET ORAL at 13:24

## 2017-02-27 RX ADMIN — Medication 0.5 MILLIGRAM(S): at 07:58

## 2017-02-27 RX ADMIN — NYSTATIN CREAM 1 APPLICATION(S): 100000 CREAM TOPICAL at 06:51

## 2017-02-27 RX ADMIN — Medication 3 MILLILITER(S): at 02:14

## 2017-02-27 RX ADMIN — Medication 150 MILLIGRAM(S): at 21:44

## 2017-02-27 RX ADMIN — CHLORHEXIDINE GLUCONATE 15 MILLILITER(S): 213 SOLUTION TOPICAL at 17:07

## 2017-02-27 RX ADMIN — PANTOPRAZOLE SODIUM 40 MILLIGRAM(S): 20 TABLET, DELAYED RELEASE ORAL at 11:57

## 2017-02-27 RX ADMIN — WARFARIN SODIUM 1 MILLIGRAM(S): 2.5 TABLET ORAL at 23:00

## 2017-02-28 LAB
ALBUMIN SERPL ELPH-MCNC: 2.7 G/DL — LOW (ref 3.3–5)
ALP SERPL-CCNC: 82 U/L — SIGNIFICANT CHANGE UP (ref 40–120)
ALT FLD-CCNC: 18 U/L — SIGNIFICANT CHANGE UP (ref 12–78)
ANION GAP SERPL CALC-SCNC: 6 MMOL/L — SIGNIFICANT CHANGE UP (ref 5–17)
APTT BLD: 32.5 SEC — SIGNIFICANT CHANGE UP (ref 27.5–37.4)
AST SERPL-CCNC: 11 U/L — LOW (ref 15–37)
BASE EXCESS BLDA CALC-SCNC: 18.5 MMOL/L — HIGH (ref -2–2)
BASOPHILS # BLD AUTO: 0.1 K/UL — SIGNIFICANT CHANGE UP (ref 0–0.2)
BASOPHILS NFR BLD AUTO: 0.4 % — SIGNIFICANT CHANGE UP (ref 0–2)
BILIRUB SERPL-MCNC: 0.4 MG/DL — SIGNIFICANT CHANGE UP (ref 0.2–1.2)
BLOOD GAS COMMENTS ARTERIAL: SIGNIFICANT CHANGE UP
BLOOD GAS COMMENTS ARTERIAL: SIGNIFICANT CHANGE UP
BUN SERPL-MCNC: 20 MG/DL — SIGNIFICANT CHANGE UP (ref 7–23)
CALCIUM SERPL-MCNC: 8.7 MG/DL — SIGNIFICANT CHANGE UP (ref 8.5–10.1)
CHLORIDE SERPL-SCNC: 84 MMOL/L — LOW (ref 96–108)
CO2 SERPL-SCNC: 44 MMOL/L — HIGH (ref 22–31)
CREAT SERPL-MCNC: 1.2 MG/DL — SIGNIFICANT CHANGE UP (ref 0.5–1.3)
CULTURE RESULTS: SIGNIFICANT CHANGE UP
CULTURE RESULTS: SIGNIFICANT CHANGE UP
EOSINOPHIL # BLD AUTO: 0 K/UL — SIGNIFICANT CHANGE UP (ref 0–0.5)
EOSINOPHIL NFR BLD AUTO: 0.1 % — SIGNIFICANT CHANGE UP (ref 0–6)
GLUCOSE SERPL-MCNC: 101 MG/DL — HIGH (ref 70–99)
HCO3 BLDA-SCNC: 41 MMOL/L — HIGH (ref 23–27)
HCT VFR BLD CALC: 30.7 % — LOW (ref 34.5–45)
HGB BLD-MCNC: 9.5 G/DL — LOW (ref 11.5–15.5)
HOROWITZ INDEX BLDA+IHG-RTO: 44 — SIGNIFICANT CHANGE UP
INR BLD: 1.84 RATIO — HIGH (ref 0.88–1.16)
LYMPHOCYTES # BLD AUTO: 0.5 K/UL — LOW (ref 1–3.3)
LYMPHOCYTES # BLD AUTO: 3 % — LOW (ref 13–44)
MAGNESIUM SERPL-MCNC: 2 MG/DL — SIGNIFICANT CHANGE UP (ref 1.8–2.4)
MCHC RBC-ENTMCNC: 31.1 GM/DL — LOW (ref 32–36)
MCHC RBC-ENTMCNC: 32.2 PG — SIGNIFICANT CHANGE UP (ref 27–34)
MCV RBC AUTO: 103.4 FL — HIGH (ref 80–100)
MONOCYTES # BLD AUTO: 0.9 K/UL — SIGNIFICANT CHANGE UP (ref 0–0.9)
MONOCYTES NFR BLD AUTO: 5.7 % — SIGNIFICANT CHANGE UP (ref 1–9)
NEUTROPHILS # BLD AUTO: 14 K/UL — HIGH (ref 1.8–7.4)
NEUTROPHILS NFR BLD AUTO: 90.8 % — HIGH (ref 43–77)
PCO2 BLDA: 62 MMHG — HIGH (ref 32–46)
PH BLDA: 7.46 — HIGH (ref 7.35–7.45)
PHOSPHATE SERPL-MCNC: 2.3 MG/DL — LOW (ref 2.5–4.5)
PLATELET # BLD AUTO: 266 K/UL — SIGNIFICANT CHANGE UP (ref 150–400)
PO2 BLDA: 41 MMHG — CRITICAL LOW (ref 74–108)
POTASSIUM SERPL-MCNC: 3.5 MMOL/L — SIGNIFICANT CHANGE UP (ref 3.5–5.3)
POTASSIUM SERPL-SCNC: 3.5 MMOL/L — SIGNIFICANT CHANGE UP (ref 3.5–5.3)
PROT SERPL-MCNC: 6.6 G/DL — SIGNIFICANT CHANGE UP (ref 6–8.3)
PROTHROM AB SERPL-ACNC: 20.5 SEC — HIGH (ref 10–13.1)
RBC # BLD: 2.97 M/UL — LOW (ref 3.8–5.2)
RBC # FLD: 20.4 % — HIGH (ref 10.3–14.5)
SAO2 % BLDA: 73 % — LOW (ref 92–96)
SODIUM SERPL-SCNC: 134 MMOL/L — LOW (ref 135–145)
SPECIMEN SOURCE: SIGNIFICANT CHANGE UP
SPECIMEN SOURCE: SIGNIFICANT CHANGE UP
VANCOMYCIN TROUGH SERPL-MCNC: 19 UG/ML — SIGNIFICANT CHANGE UP (ref 10–20)
WBC # BLD: 15.5 K/UL — HIGH (ref 3.8–10.5)
WBC # FLD AUTO: 15.5 K/UL — HIGH (ref 3.8–10.5)

## 2017-02-28 PROCEDURE — 99291 CRITICAL CARE FIRST HOUR: CPT

## 2017-02-28 RX ORDER — ACETAZOLAMIDE 250 MG/1
250 TABLET ORAL ONCE
Qty: 0 | Refills: 0 | Status: COMPLETED | OUTPATIENT
Start: 2017-02-28 | End: 2017-02-28

## 2017-02-28 RX ORDER — POTASSIUM PHOSPHATE, MONOBASIC POTASSIUM PHOSPHATE, DIBASIC 236; 224 MG/ML; MG/ML
15 INJECTION, SOLUTION INTRAVENOUS ONCE
Qty: 0 | Refills: 0 | Status: COMPLETED | OUTPATIENT
Start: 2017-02-28 | End: 2017-02-28

## 2017-02-28 RX ADMIN — Medication 0.5 MILLIGRAM(S): at 22:55

## 2017-02-28 RX ADMIN — NYSTATIN CREAM 1 APPLICATION(S): 100000 CREAM TOPICAL at 05:52

## 2017-02-28 RX ADMIN — Medication 1 PATCH: at 17:34

## 2017-02-28 RX ADMIN — PANTOPRAZOLE SODIUM 40 MILLIGRAM(S): 20 TABLET, DELAYED RELEASE ORAL at 05:53

## 2017-02-28 RX ADMIN — Medication 3 MILLILITER(S): at 22:56

## 2017-02-28 RX ADMIN — Medication 81 MILLIGRAM(S): at 12:15

## 2017-02-28 RX ADMIN — ACETAZOLAMIDE 105 MILLIGRAM(S): 250 TABLET ORAL at 08:59

## 2017-02-28 RX ADMIN — MIDODRINE HYDROCHLORIDE 10 MILLIGRAM(S): 2.5 TABLET ORAL at 13:42

## 2017-02-28 RX ADMIN — NYSTATIN CREAM 1 APPLICATION(S): 100000 CREAM TOPICAL at 21:49

## 2017-02-28 RX ADMIN — Medication 3 MILLILITER(S): at 07:55

## 2017-02-28 RX ADMIN — Medication 150 MILLIGRAM(S): at 21:49

## 2017-02-28 RX ADMIN — Medication 20 MILLIGRAM(S): at 05:53

## 2017-02-28 RX ADMIN — Medication 3 MILLILITER(S): at 14:46

## 2017-02-28 RX ADMIN — Medication 1 TABLET(S): at 12:15

## 2017-02-28 RX ADMIN — MIDODRINE HYDROCHLORIDE 10 MILLIGRAM(S): 2.5 TABLET ORAL at 05:53

## 2017-02-28 RX ADMIN — POTASSIUM PHOSPHATE, MONOBASIC POTASSIUM PHOSPHATE, DIBASIC 63.75 MILLIMOLE(S): 236; 224 INJECTION, SOLUTION INTRAVENOUS at 12:15

## 2017-02-28 RX ADMIN — Medication 0.5 MILLIGRAM(S): at 07:56

## 2017-02-28 RX ADMIN — Medication 1 PATCH: at 12:15

## 2017-02-28 RX ADMIN — NYSTATIN CREAM 1 APPLICATION(S): 100000 CREAM TOPICAL at 13:42

## 2017-02-28 RX ADMIN — Medication 40 MILLIGRAM(S): at 05:53

## 2017-02-28 RX ADMIN — Medication 3 MILLILITER(S): at 07:56

## 2017-02-28 RX ADMIN — Medication 3 MILLILITER(S): at 14:35

## 2017-03-01 LAB
ANION GAP SERPL CALC-SCNC: 5 MMOL/L — SIGNIFICANT CHANGE UP (ref 5–17)
BUN SERPL-MCNC: 22 MG/DL — SIGNIFICANT CHANGE UP (ref 7–23)
CALCIUM SERPL-MCNC: 9.1 MG/DL — SIGNIFICANT CHANGE UP (ref 8.5–10.1)
CHLORIDE SERPL-SCNC: 89 MMOL/L — LOW (ref 96–108)
CO2 SERPL-SCNC: 41 MMOL/L — HIGH (ref 22–31)
CREAT SERPL-MCNC: 1.3 MG/DL — SIGNIFICANT CHANGE UP (ref 0.5–1.3)
GLUCOSE SERPL-MCNC: 112 MG/DL — HIGH (ref 70–99)
HCT VFR BLD CALC: 32.6 % — LOW (ref 34.5–45)
HGB BLD-MCNC: 9.9 G/DL — LOW (ref 11.5–15.5)
INR BLD: 1.61 RATIO — HIGH (ref 0.88–1.16)
MAGNESIUM SERPL-MCNC: 2.3 MG/DL — SIGNIFICANT CHANGE UP (ref 1.8–2.4)
MCHC RBC-ENTMCNC: 30.5 GM/DL — LOW (ref 32–36)
MCHC RBC-ENTMCNC: 31.6 PG — SIGNIFICANT CHANGE UP (ref 27–34)
MCV RBC AUTO: 103.5 FL — HIGH (ref 80–100)
PHOSPHATE SERPL-MCNC: 2.7 MG/DL — SIGNIFICANT CHANGE UP (ref 2.5–4.5)
PLATELET # BLD AUTO: 321 K/UL — SIGNIFICANT CHANGE UP (ref 150–400)
POTASSIUM SERPL-MCNC: 3.2 MMOL/L — LOW (ref 3.5–5.3)
POTASSIUM SERPL-SCNC: 3.2 MMOL/L — LOW (ref 3.5–5.3)
PROTHROM AB SERPL-ACNC: 18 SEC — HIGH (ref 10–13.1)
RBC # BLD: 3.14 M/UL — LOW (ref 3.8–5.2)
RBC # FLD: 20.2 % — HIGH (ref 10.3–14.5)
SODIUM SERPL-SCNC: 135 MMOL/L — SIGNIFICANT CHANGE UP (ref 135–145)
WBC # BLD: 16.2 K/UL — HIGH (ref 3.8–10.5)
WBC # FLD AUTO: 16.2 K/UL — HIGH (ref 3.8–10.5)

## 2017-03-01 PROCEDURE — 99291 CRITICAL CARE FIRST HOUR: CPT

## 2017-03-01 RX ORDER — POTASSIUM CHLORIDE 20 MEQ
40 PACKET (EA) ORAL EVERY 4 HOURS
Qty: 0 | Refills: 0 | Status: COMPLETED | OUTPATIENT
Start: 2017-03-01 | End: 2017-03-01

## 2017-03-01 RX ORDER — NYSTATIN 500MM UNIT
500000 POWDER (EA) MISCELLANEOUS EVERY 6 HOURS
Qty: 0 | Refills: 0 | Status: DISCONTINUED | OUTPATIENT
Start: 2017-03-01 | End: 2017-03-08

## 2017-03-01 RX ORDER — PIPERACILLIN AND TAZOBACTAM 4; .5 G/20ML; G/20ML
3.38 INJECTION, POWDER, LYOPHILIZED, FOR SOLUTION INTRAVENOUS ONCE
Qty: 0 | Refills: 0 | Status: COMPLETED | OUTPATIENT
Start: 2017-03-01 | End: 2017-03-01

## 2017-03-01 RX ORDER — WARFARIN SODIUM 2.5 MG/1
2 TABLET ORAL ONCE
Qty: 0 | Refills: 0 | Status: COMPLETED | OUTPATIENT
Start: 2017-03-01 | End: 2017-03-01

## 2017-03-01 RX ORDER — PIPERACILLIN AND TAZOBACTAM 4; .5 G/20ML; G/20ML
3.38 INJECTION, POWDER, LYOPHILIZED, FOR SOLUTION INTRAVENOUS EVERY 8 HOURS
Qty: 0 | Refills: 0 | Status: COMPLETED | OUTPATIENT
Start: 2017-03-01 | End: 2017-03-07

## 2017-03-01 RX ADMIN — Medication 20 MILLIGRAM(S): at 06:05

## 2017-03-01 RX ADMIN — Medication 500000 UNIT(S): at 17:30

## 2017-03-01 RX ADMIN — PIPERACILLIN AND TAZOBACTAM 200 GRAM(S): 4; .5 INJECTION, POWDER, LYOPHILIZED, FOR SOLUTION INTRAVENOUS at 08:15

## 2017-03-01 RX ADMIN — WARFARIN SODIUM 2 MILLIGRAM(S): 2.5 TABLET ORAL at 21:33

## 2017-03-01 RX ADMIN — Medication 500000 UNIT(S): at 23:33

## 2017-03-01 RX ADMIN — Medication 3 MILLILITER(S): at 19:44

## 2017-03-01 RX ADMIN — Medication 81 MILLIGRAM(S): at 12:10

## 2017-03-01 RX ADMIN — Medication 40 MILLIEQUIVALENT(S): at 14:04

## 2017-03-01 RX ADMIN — PIPERACILLIN AND TAZOBACTAM 25 GRAM(S): 4; .5 INJECTION, POWDER, LYOPHILIZED, FOR SOLUTION INTRAVENOUS at 14:04

## 2017-03-01 RX ADMIN — Medication 3 MILLILITER(S): at 13:55

## 2017-03-01 RX ADMIN — PIPERACILLIN AND TAZOBACTAM 25 GRAM(S): 4; .5 INJECTION, POWDER, LYOPHILIZED, FOR SOLUTION INTRAVENOUS at 21:33

## 2017-03-01 RX ADMIN — NYSTATIN CREAM 1 APPLICATION(S): 100000 CREAM TOPICAL at 06:06

## 2017-03-01 RX ADMIN — Medication 3 MILLILITER(S): at 05:04

## 2017-03-01 RX ADMIN — Medication 1 PATCH: at 12:10

## 2017-03-01 RX ADMIN — WARFARIN SODIUM 2 MILLIGRAM(S): 2.5 TABLET ORAL at 05:15

## 2017-03-01 RX ADMIN — Medication 3 MILLILITER(S): at 05:05

## 2017-03-01 RX ADMIN — Medication 40 MILLIEQUIVALENT(S): at 08:58

## 2017-03-01 RX ADMIN — NYSTATIN CREAM 1 APPLICATION(S): 100000 CREAM TOPICAL at 21:33

## 2017-03-01 RX ADMIN — Medication 40 MILLIGRAM(S): at 06:05

## 2017-03-01 RX ADMIN — NYSTATIN CREAM 1 APPLICATION(S): 100000 CREAM TOPICAL at 14:05

## 2017-03-01 RX ADMIN — PANTOPRAZOLE SODIUM 40 MILLIGRAM(S): 20 TABLET, DELAYED RELEASE ORAL at 06:05

## 2017-03-01 RX ADMIN — Medication 1 TABLET(S): at 12:10

## 2017-03-01 RX ADMIN — Medication 0.5 MILLIGRAM(S): at 19:44

## 2017-03-02 LAB
ANION GAP SERPL CALC-SCNC: 6 MMOL/L — SIGNIFICANT CHANGE UP (ref 5–17)
BUN SERPL-MCNC: 24 MG/DL — HIGH (ref 7–23)
CALCIUM SERPL-MCNC: 9.2 MG/DL — SIGNIFICANT CHANGE UP (ref 8.5–10.1)
CHLORIDE SERPL-SCNC: 89 MMOL/L — LOW (ref 96–108)
CO2 SERPL-SCNC: 42 MMOL/L — HIGH (ref 22–31)
CREAT SERPL-MCNC: 1.4 MG/DL — HIGH (ref 0.5–1.3)
GLUCOSE SERPL-MCNC: 114 MG/DL — HIGH (ref 70–99)
HCT VFR BLD CALC: 32 % — LOW (ref 34.5–45)
HGB BLD-MCNC: 9.7 G/DL — LOW (ref 11.5–15.5)
INR BLD: 1.66 RATIO — HIGH (ref 0.88–1.16)
MAGNESIUM SERPL-MCNC: 2.3 MG/DL — SIGNIFICANT CHANGE UP (ref 1.8–2.4)
MCHC RBC-ENTMCNC: 30.4 GM/DL — LOW (ref 32–36)
MCHC RBC-ENTMCNC: 31.6 PG — SIGNIFICANT CHANGE UP (ref 27–34)
MCV RBC AUTO: 104 FL — HIGH (ref 80–100)
PHOSPHATE SERPL-MCNC: 2.6 MG/DL — SIGNIFICANT CHANGE UP (ref 2.5–4.5)
PLATELET # BLD AUTO: 294 K/UL — SIGNIFICANT CHANGE UP (ref 150–400)
POTASSIUM SERPL-MCNC: 3.7 MMOL/L — SIGNIFICANT CHANGE UP (ref 3.5–5.3)
POTASSIUM SERPL-SCNC: 3.7 MMOL/L — SIGNIFICANT CHANGE UP (ref 3.5–5.3)
PROTHROM AB SERPL-ACNC: 18.5 SEC — HIGH (ref 10–13.1)
RBC # BLD: 3.08 M/UL — LOW (ref 3.8–5.2)
RBC # FLD: 19.6 % — HIGH (ref 10.3–14.5)
SODIUM SERPL-SCNC: 137 MMOL/L — SIGNIFICANT CHANGE UP (ref 135–145)
WBC # BLD: 12.4 K/UL — HIGH (ref 3.8–10.5)
WBC # FLD AUTO: 12.4 K/UL — HIGH (ref 3.8–10.5)

## 2017-03-02 PROCEDURE — 99291 CRITICAL CARE FIRST HOUR: CPT

## 2017-03-02 RX ORDER — WARFARIN SODIUM 2.5 MG/1
3 TABLET ORAL ONCE
Qty: 0 | Refills: 0 | Status: COMPLETED | OUTPATIENT
Start: 2017-03-02 | End: 2017-03-02

## 2017-03-02 RX ORDER — POTASSIUM CHLORIDE 20 MEQ
20 PACKET (EA) ORAL DAILY
Qty: 0 | Refills: 0 | Status: DISCONTINUED | OUTPATIENT
Start: 2017-03-02 | End: 2017-03-05

## 2017-03-02 RX ADMIN — Medication 0.5 MILLIGRAM(S): at 19:20

## 2017-03-02 RX ADMIN — Medication 3 MILLILITER(S): at 19:21

## 2017-03-02 RX ADMIN — NYSTATIN CREAM 1 APPLICATION(S): 100000 CREAM TOPICAL at 05:15

## 2017-03-02 RX ADMIN — PANTOPRAZOLE SODIUM 40 MILLIGRAM(S): 20 TABLET, DELAYED RELEASE ORAL at 06:35

## 2017-03-02 RX ADMIN — Medication 3 MILLILITER(S): at 08:06

## 2017-03-02 RX ADMIN — Medication 1 TABLET(S): at 11:13

## 2017-03-02 RX ADMIN — WARFARIN SODIUM 3 MILLIGRAM(S): 2.5 TABLET ORAL at 22:18

## 2017-03-02 RX ADMIN — Medication 3 MILLILITER(S): at 01:58

## 2017-03-02 RX ADMIN — NYSTATIN CREAM 1 APPLICATION(S): 100000 CREAM TOPICAL at 22:18

## 2017-03-02 RX ADMIN — Medication 3 MILLILITER(S): at 19:20

## 2017-03-02 RX ADMIN — Medication 0.5 MILLIGRAM(S): at 08:06

## 2017-03-02 RX ADMIN — PIPERACILLIN AND TAZOBACTAM 25 GRAM(S): 4; .5 INJECTION, POWDER, LYOPHILIZED, FOR SOLUTION INTRAVENOUS at 05:14

## 2017-03-02 RX ADMIN — Medication 500000 UNIT(S): at 05:14

## 2017-03-02 RX ADMIN — Medication 81 MILLIGRAM(S): at 11:13

## 2017-03-02 RX ADMIN — NYSTATIN CREAM 1 APPLICATION(S): 100000 CREAM TOPICAL at 13:30

## 2017-03-02 RX ADMIN — Medication 20 MILLIGRAM(S): at 05:14

## 2017-03-02 RX ADMIN — Medication 500000 UNIT(S): at 17:03

## 2017-03-02 RX ADMIN — Medication 500000 UNIT(S): at 11:16

## 2017-03-02 RX ADMIN — PIPERACILLIN AND TAZOBACTAM 25 GRAM(S): 4; .5 INJECTION, POWDER, LYOPHILIZED, FOR SOLUTION INTRAVENOUS at 13:29

## 2017-03-02 RX ADMIN — Medication 40 MILLIGRAM(S): at 05:14

## 2017-03-02 RX ADMIN — PIPERACILLIN AND TAZOBACTAM 25 GRAM(S): 4; .5 INJECTION, POWDER, LYOPHILIZED, FOR SOLUTION INTRAVENOUS at 22:17

## 2017-03-02 RX ADMIN — Medication 1 PATCH: at 11:13

## 2017-03-02 RX ADMIN — Medication 1 PATCH: at 11:14

## 2017-03-02 RX ADMIN — Medication 3 MILLILITER(S): at 01:47

## 2017-03-03 LAB
ANION GAP SERPL CALC-SCNC: 9 MMOL/L — SIGNIFICANT CHANGE UP (ref 5–17)
BUN SERPL-MCNC: 25 MG/DL — HIGH (ref 7–23)
CALCIUM SERPL-MCNC: 9 MG/DL — SIGNIFICANT CHANGE UP (ref 8.5–10.1)
CHLORIDE SERPL-SCNC: 85 MMOL/L — LOW (ref 96–108)
CO2 SERPL-SCNC: 43 MMOL/L — HIGH (ref 22–31)
CREAT SERPL-MCNC: 1.5 MG/DL — HIGH (ref 0.5–1.3)
GLUCOSE SERPL-MCNC: 92 MG/DL — SIGNIFICANT CHANGE UP (ref 70–99)
HCT VFR BLD CALC: 32.2 % — LOW (ref 34.5–45)
HGB BLD-MCNC: 10 G/DL — LOW (ref 11.5–15.5)
INR BLD: 1.89 RATIO — HIGH (ref 0.88–1.16)
MAGNESIUM SERPL-MCNC: 2.1 MG/DL — SIGNIFICANT CHANGE UP (ref 1.8–2.4)
MCHC RBC-ENTMCNC: 30.9 GM/DL — LOW (ref 32–36)
MCHC RBC-ENTMCNC: 31.8 PG — SIGNIFICANT CHANGE UP (ref 27–34)
MCV RBC AUTO: 102.9 FL — HIGH (ref 80–100)
PHOSPHATE SERPL-MCNC: 2.7 MG/DL — SIGNIFICANT CHANGE UP (ref 2.5–4.5)
PLATELET # BLD AUTO: 344 K/UL — SIGNIFICANT CHANGE UP (ref 150–400)
POTASSIUM SERPL-MCNC: 3.4 MMOL/L — LOW (ref 3.5–5.3)
POTASSIUM SERPL-SCNC: 3.4 MMOL/L — LOW (ref 3.5–5.3)
PROTHROM AB SERPL-ACNC: 21.1 SEC — HIGH (ref 10–13.1)
RBC # BLD: 3.13 M/UL — LOW (ref 3.8–5.2)
RBC # FLD: 19.1 % — HIGH (ref 10.3–14.5)
SODIUM SERPL-SCNC: 137 MMOL/L — SIGNIFICANT CHANGE UP (ref 135–145)
WBC # BLD: 12.3 K/UL — HIGH (ref 3.8–10.5)
WBC # FLD AUTO: 12.3 K/UL — HIGH (ref 3.8–10.5)

## 2017-03-03 PROCEDURE — 99291 CRITICAL CARE FIRST HOUR: CPT

## 2017-03-03 RX ORDER — WARFARIN SODIUM 2.5 MG/1
3 TABLET ORAL ONCE
Qty: 0 | Refills: 0 | Status: COMPLETED | OUTPATIENT
Start: 2017-03-03 | End: 2017-03-03

## 2017-03-03 RX ORDER — POTASSIUM CHLORIDE 20 MEQ
40 PACKET (EA) ORAL ONCE
Qty: 0 | Refills: 0 | Status: COMPLETED | OUTPATIENT
Start: 2017-03-03 | End: 2017-03-03

## 2017-03-03 RX ADMIN — Medication 1 TABLET(S): at 11:37

## 2017-03-03 RX ADMIN — Medication 3 MILLILITER(S): at 02:06

## 2017-03-03 RX ADMIN — Medication 0.5 MILLIGRAM(S): at 07:32

## 2017-03-03 RX ADMIN — Medication 500000 UNIT(S): at 11:37

## 2017-03-03 RX ADMIN — WARFARIN SODIUM 3 MILLIGRAM(S): 2.5 TABLET ORAL at 21:03

## 2017-03-03 RX ADMIN — Medication 3 MILLILITER(S): at 19:12

## 2017-03-03 RX ADMIN — Medication 0.5 MILLIGRAM(S): at 19:11

## 2017-03-03 RX ADMIN — Medication 3 MILLILITER(S): at 14:33

## 2017-03-03 RX ADMIN — Medication 3 MILLILITER(S): at 14:34

## 2017-03-03 RX ADMIN — PIPERACILLIN AND TAZOBACTAM 25 GRAM(S): 4; .5 INJECTION, POWDER, LYOPHILIZED, FOR SOLUTION INTRAVENOUS at 05:11

## 2017-03-03 RX ADMIN — Medication 1 PATCH: at 11:36

## 2017-03-03 RX ADMIN — Medication 500000 UNIT(S): at 17:03

## 2017-03-03 RX ADMIN — Medication 1 PATCH: at 11:37

## 2017-03-03 RX ADMIN — NYSTATIN CREAM 1 APPLICATION(S): 100000 CREAM TOPICAL at 13:02

## 2017-03-03 RX ADMIN — NYSTATIN CREAM 1 APPLICATION(S): 100000 CREAM TOPICAL at 05:12

## 2017-03-03 RX ADMIN — Medication 20 MILLIEQUIVALENT(S): at 13:02

## 2017-03-03 RX ADMIN — NYSTATIN CREAM 1 APPLICATION(S): 100000 CREAM TOPICAL at 21:03

## 2017-03-03 RX ADMIN — PIPERACILLIN AND TAZOBACTAM 25 GRAM(S): 4; .5 INJECTION, POWDER, LYOPHILIZED, FOR SOLUTION INTRAVENOUS at 21:03

## 2017-03-03 RX ADMIN — PIPERACILLIN AND TAZOBACTAM 25 GRAM(S): 4; .5 INJECTION, POWDER, LYOPHILIZED, FOR SOLUTION INTRAVENOUS at 13:01

## 2017-03-03 RX ADMIN — Medication 81 MILLIGRAM(S): at 11:37

## 2017-03-03 RX ADMIN — Medication 3 MILLILITER(S): at 02:05

## 2017-03-03 RX ADMIN — Medication 3 MILLILITER(S): at 19:11

## 2017-03-03 RX ADMIN — Medication 500000 UNIT(S): at 00:07

## 2017-03-03 RX ADMIN — Medication 40 MILLIEQUIVALENT(S): at 11:37

## 2017-03-03 RX ADMIN — Medication 3 MILLILITER(S): at 07:32

## 2017-03-03 RX ADMIN — Medication 40 MILLIGRAM(S): at 05:10

## 2017-03-03 RX ADMIN — PANTOPRAZOLE SODIUM 40 MILLIGRAM(S): 20 TABLET, DELAYED RELEASE ORAL at 05:11

## 2017-03-03 RX ADMIN — Medication 500000 UNIT(S): at 23:37

## 2017-03-03 RX ADMIN — Medication 20 MILLIGRAM(S): at 05:10

## 2017-03-03 RX ADMIN — Medication 3 MILLILITER(S): at 07:31

## 2017-03-03 RX ADMIN — Medication 500000 UNIT(S): at 05:10

## 2017-03-04 LAB
ANION GAP SERPL CALC-SCNC: 7 MMOL/L — SIGNIFICANT CHANGE UP (ref 5–17)
BUN SERPL-MCNC: 26 MG/DL — HIGH (ref 7–23)
CALCIUM SERPL-MCNC: 8.9 MG/DL — SIGNIFICANT CHANGE UP (ref 8.5–10.1)
CHLORIDE SERPL-SCNC: 90 MMOL/L — LOW (ref 96–108)
CO2 SERPL-SCNC: 41 MMOL/L — HIGH (ref 22–31)
CREAT SERPL-MCNC: 1.4 MG/DL — HIGH (ref 0.5–1.3)
GLUCOSE SERPL-MCNC: 103 MG/DL — HIGH (ref 70–99)
HCT VFR BLD CALC: 33.1 % — LOW (ref 34.5–45)
HGB BLD-MCNC: 10.5 G/DL — LOW (ref 11.5–15.5)
INR BLD: 2.46 RATIO — HIGH (ref 0.88–1.16)
MAGNESIUM SERPL-MCNC: 2.3 MG/DL — SIGNIFICANT CHANGE UP (ref 1.8–2.4)
MCHC RBC-ENTMCNC: 31.5 PG — SIGNIFICANT CHANGE UP (ref 27–34)
MCHC RBC-ENTMCNC: 31.6 GM/DL — LOW (ref 32–36)
MCV RBC AUTO: 99.8 FL — SIGNIFICANT CHANGE UP (ref 80–100)
PHOSPHATE SERPL-MCNC: 2.9 MG/DL — SIGNIFICANT CHANGE UP (ref 2.5–4.5)
PLATELET # BLD AUTO: 380 K/UL — SIGNIFICANT CHANGE UP (ref 150–400)
POTASSIUM SERPL-MCNC: 4.1 MMOL/L — SIGNIFICANT CHANGE UP (ref 3.5–5.3)
POTASSIUM SERPL-SCNC: 4.1 MMOL/L — SIGNIFICANT CHANGE UP (ref 3.5–5.3)
PROTHROM AB SERPL-ACNC: 27.6 SEC — HIGH (ref 10–13.1)
RBC # BLD: 3.32 M/UL — LOW (ref 3.8–5.2)
RBC # FLD: 19.2 % — HIGH (ref 10.3–14.5)
SODIUM SERPL-SCNC: 138 MMOL/L — SIGNIFICANT CHANGE UP (ref 135–145)
WBC # BLD: 15.7 K/UL — HIGH (ref 3.8–10.5)
WBC # FLD AUTO: 15.7 K/UL — HIGH (ref 3.8–10.5)

## 2017-03-04 PROCEDURE — 99233 SBSQ HOSP IP/OBS HIGH 50: CPT

## 2017-03-04 RX ORDER — POLYETHYLENE GLYCOL 3350 17 G/17G
17 POWDER, FOR SOLUTION ORAL
Qty: 0 | Refills: 0 | Status: DISCONTINUED | OUTPATIENT
Start: 2017-03-04 | End: 2017-03-08

## 2017-03-04 RX ORDER — IPRATROPIUM/ALBUTEROL SULFATE 18-103MCG
3 AEROSOL WITH ADAPTER (GRAM) INHALATION EVERY 4 HOURS
Qty: 0 | Refills: 0 | Status: DISCONTINUED | OUTPATIENT
Start: 2017-03-04 | End: 2017-03-08

## 2017-03-04 RX ORDER — WARFARIN SODIUM 2.5 MG/1
1 TABLET ORAL ONCE
Qty: 0 | Refills: 0 | Status: COMPLETED | OUTPATIENT
Start: 2017-03-04 | End: 2017-03-04

## 2017-03-04 RX ORDER — SENNA PLUS 8.6 MG/1
2 TABLET ORAL AT BEDTIME
Qty: 0 | Refills: 0 | Status: DISCONTINUED | OUTPATIENT
Start: 2017-03-04 | End: 2017-03-08

## 2017-03-04 RX ORDER — ACETYLCYSTEINE 200 MG/ML
3 VIAL (ML) MISCELLANEOUS EVERY 4 HOURS
Qty: 0 | Refills: 0 | Status: DISCONTINUED | OUTPATIENT
Start: 2017-03-04 | End: 2017-03-08

## 2017-03-04 RX ORDER — DOCUSATE SODIUM 100 MG
100 CAPSULE ORAL THREE TIMES A DAY
Qty: 0 | Refills: 0 | Status: DISCONTINUED | OUTPATIENT
Start: 2017-03-04 | End: 2017-03-08

## 2017-03-04 RX ADMIN — Medication 1 TABLET(S): at 14:38

## 2017-03-04 RX ADMIN — PIPERACILLIN AND TAZOBACTAM 25 GRAM(S): 4; .5 INJECTION, POWDER, LYOPHILIZED, FOR SOLUTION INTRAVENOUS at 05:19

## 2017-03-04 RX ADMIN — Medication 3 MILLILITER(S): at 08:31

## 2017-03-04 RX ADMIN — Medication 3 MILLILITER(S): at 01:42

## 2017-03-04 RX ADMIN — NYSTATIN CREAM 1 APPLICATION(S): 100000 CREAM TOPICAL at 14:39

## 2017-03-04 RX ADMIN — Medication 3 MILLILITER(S): at 23:21

## 2017-03-04 RX ADMIN — Medication 1 TABLET(S): at 12:19

## 2017-03-04 RX ADMIN — Medication 20 MILLIGRAM(S): at 18:19

## 2017-03-04 RX ADMIN — Medication 81 MILLIGRAM(S): at 12:19

## 2017-03-04 RX ADMIN — NYSTATIN CREAM 1 APPLICATION(S): 100000 CREAM TOPICAL at 05:19

## 2017-03-04 RX ADMIN — SENNA PLUS 2 TABLET(S): 8.6 TABLET ORAL at 22:16

## 2017-03-04 RX ADMIN — WARFARIN SODIUM 1 MILLIGRAM(S): 2.5 TABLET ORAL at 22:16

## 2017-03-04 RX ADMIN — Medication 20 MILLIEQUIVALENT(S): at 12:19

## 2017-03-04 RX ADMIN — Medication 0.5 MILLIGRAM(S): at 08:31

## 2017-03-04 RX ADMIN — Medication 500000 UNIT(S): at 12:19

## 2017-03-04 RX ADMIN — Medication 3 MILLILITER(S): at 16:13

## 2017-03-04 RX ADMIN — PIPERACILLIN AND TAZOBACTAM 25 GRAM(S): 4; .5 INJECTION, POWDER, LYOPHILIZED, FOR SOLUTION INTRAVENOUS at 22:15

## 2017-03-04 RX ADMIN — Medication 500000 UNIT(S): at 18:22

## 2017-03-04 RX ADMIN — Medication 3 MILLILITER(S): at 16:14

## 2017-03-04 RX ADMIN — PIPERACILLIN AND TAZOBACTAM 25 GRAM(S): 4; .5 INJECTION, POWDER, LYOPHILIZED, FOR SOLUTION INTRAVENOUS at 14:38

## 2017-03-04 RX ADMIN — NYSTATIN CREAM 1 APPLICATION(S): 100000 CREAM TOPICAL at 22:30

## 2017-03-04 RX ADMIN — Medication 3 MILLILITER(S): at 19:19

## 2017-03-04 RX ADMIN — Medication 3 MILLILITER(S): at 12:06

## 2017-03-04 RX ADMIN — POLYETHYLENE GLYCOL 3350 17 GRAM(S): 17 POWDER, FOR SOLUTION ORAL at 18:01

## 2017-03-04 RX ADMIN — Medication 500000 UNIT(S): at 05:18

## 2017-03-04 RX ADMIN — Medication 1 PATCH: at 12:19

## 2017-03-04 RX ADMIN — Medication 40 MILLIGRAM(S): at 05:18

## 2017-03-04 RX ADMIN — PANTOPRAZOLE SODIUM 40 MILLIGRAM(S): 20 TABLET, DELAYED RELEASE ORAL at 05:21

## 2017-03-04 RX ADMIN — Medication 0.5 MILLIGRAM(S): at 19:19

## 2017-03-04 RX ADMIN — Medication 100 MILLIGRAM(S): at 14:38

## 2017-03-04 RX ADMIN — Medication 500000 UNIT(S): at 23:53

## 2017-03-04 RX ADMIN — Medication 1 PATCH: at 11:00

## 2017-03-04 RX ADMIN — Medication 100 MILLIGRAM(S): at 22:16

## 2017-03-04 RX ADMIN — Medication 20 MILLIGRAM(S): at 05:23

## 2017-03-05 LAB
ANION GAP SERPL CALC-SCNC: 9 MMOL/L — SIGNIFICANT CHANGE UP (ref 5–17)
APTT BLD: 40.6 SEC — HIGH (ref 27.5–37.4)
BASOPHILS # BLD AUTO: 0 K/UL — SIGNIFICANT CHANGE UP (ref 0–0.2)
BASOPHILS NFR BLD AUTO: 0.2 % — SIGNIFICANT CHANGE UP (ref 0–2)
BUN SERPL-MCNC: 29 MG/DL — HIGH (ref 7–23)
CALCIUM SERPL-MCNC: 9.1 MG/DL — SIGNIFICANT CHANGE UP (ref 8.5–10.1)
CHLORIDE SERPL-SCNC: 86 MMOL/L — LOW (ref 96–108)
CO2 SERPL-SCNC: 41 MMOL/L — HIGH (ref 22–31)
CREAT SERPL-MCNC: 1.4 MG/DL — HIGH (ref 0.5–1.3)
EOSINOPHIL # BLD AUTO: 0 K/UL — SIGNIFICANT CHANGE UP (ref 0–0.5)
EOSINOPHIL NFR BLD AUTO: 0 % — SIGNIFICANT CHANGE UP (ref 0–6)
GLUCOSE SERPL-MCNC: 132 MG/DL — HIGH (ref 70–99)
HCT VFR BLD CALC: 34.2 % — LOW (ref 34.5–45)
HGB BLD-MCNC: 10.7 G/DL — LOW (ref 11.5–15.5)
INR BLD: 2.94 RATIO — HIGH (ref 0.88–1.16)
LYMPHOCYTES # BLD AUTO: 0.4 K/UL — LOW (ref 1–3.3)
LYMPHOCYTES # BLD AUTO: 2.7 % — LOW (ref 13–44)
MAGNESIUM SERPL-MCNC: 2.3 MG/DL — SIGNIFICANT CHANGE UP (ref 1.8–2.4)
MCHC RBC-ENTMCNC: 31.2 GM/DL — LOW (ref 32–36)
MCHC RBC-ENTMCNC: 31.9 PG — SIGNIFICANT CHANGE UP (ref 27–34)
MCV RBC AUTO: 102.3 FL — HIGH (ref 80–100)
MONOCYTES # BLD AUTO: 0.2 K/UL — SIGNIFICANT CHANGE UP (ref 0–0.9)
MONOCYTES NFR BLD AUTO: 1.1 % — SIGNIFICANT CHANGE UP (ref 1–9)
NEUTROPHILS # BLD AUTO: 14.8 K/UL — HIGH (ref 1.8–7.4)
NEUTROPHILS NFR BLD AUTO: 96 % — HIGH (ref 43–77)
PHOSPHATE SERPL-MCNC: 4.2 MG/DL — SIGNIFICANT CHANGE UP (ref 2.5–4.5)
PLATELET # BLD AUTO: 440 K/UL — HIGH (ref 150–400)
POTASSIUM SERPL-MCNC: 4.3 MMOL/L — SIGNIFICANT CHANGE UP (ref 3.5–5.3)
POTASSIUM SERPL-SCNC: 4.3 MMOL/L — SIGNIFICANT CHANGE UP (ref 3.5–5.3)
PROTHROM AB SERPL-ACNC: 33 SEC — HIGH (ref 10–13.1)
RBC # BLD: 3.34 M/UL — LOW (ref 3.8–5.2)
RBC # FLD: 18.8 % — HIGH (ref 10.3–14.5)
SODIUM SERPL-SCNC: 136 MMOL/L — SIGNIFICANT CHANGE UP (ref 135–145)
WBC # BLD: 15.5 K/UL — HIGH (ref 3.8–10.5)
WBC # FLD AUTO: 15.5 K/UL — HIGH (ref 3.8–10.5)

## 2017-03-05 PROCEDURE — 71010: CPT | Mod: 26

## 2017-03-05 PROCEDURE — 99291 CRITICAL CARE FIRST HOUR: CPT

## 2017-03-05 RX ORDER — VANCOMYCIN HCL 1 G
750 VIAL (EA) INTRAVENOUS EVERY 24 HOURS
Qty: 0 | Refills: 0 | Status: DISCONTINUED | OUTPATIENT
Start: 2017-03-05 | End: 2017-03-08

## 2017-03-05 RX ADMIN — Medication 3 MILLILITER(S): at 05:01

## 2017-03-05 RX ADMIN — Medication 3 MILLILITER(S): at 12:00

## 2017-03-05 RX ADMIN — Medication 3 MILLILITER(S): at 23:33

## 2017-03-05 RX ADMIN — POLYETHYLENE GLYCOL 3350 17 GRAM(S): 17 POWDER, FOR SOLUTION ORAL at 05:59

## 2017-03-05 RX ADMIN — SENNA PLUS 2 TABLET(S): 8.6 TABLET ORAL at 21:34

## 2017-03-05 RX ADMIN — Medication 3 MILLILITER(S): at 15:30

## 2017-03-05 RX ADMIN — NYSTATIN CREAM 1 APPLICATION(S): 100000 CREAM TOPICAL at 14:27

## 2017-03-05 RX ADMIN — Medication 1 TABLET(S): at 14:26

## 2017-03-05 RX ADMIN — Medication 1 PATCH: at 12:44

## 2017-03-05 RX ADMIN — Medication 500000 UNIT(S): at 12:45

## 2017-03-05 RX ADMIN — Medication 20 MILLIGRAM(S): at 05:58

## 2017-03-05 RX ADMIN — Medication 0.5 MILLIGRAM(S): at 20:48

## 2017-03-05 RX ADMIN — PIPERACILLIN AND TAZOBACTAM 25 GRAM(S): 4; .5 INJECTION, POWDER, LYOPHILIZED, FOR SOLUTION INTRAVENOUS at 06:00

## 2017-03-05 RX ADMIN — Medication 150 MILLIGRAM(S): at 17:15

## 2017-03-05 RX ADMIN — Medication 20 MILLIGRAM(S): at 17:15

## 2017-03-05 RX ADMIN — Medication 81 MILLIGRAM(S): at 12:45

## 2017-03-05 RX ADMIN — Medication 3 MILLILITER(S): at 07:30

## 2017-03-05 RX ADMIN — Medication 100 MILLIGRAM(S): at 21:34

## 2017-03-05 RX ADMIN — Medication 100 MILLIGRAM(S): at 05:57

## 2017-03-05 RX ADMIN — Medication 100 MILLIGRAM(S): at 14:27

## 2017-03-05 RX ADMIN — Medication 1 PATCH: at 12:46

## 2017-03-05 RX ADMIN — Medication 500000 UNIT(S): at 17:16

## 2017-03-05 RX ADMIN — Medication 40 MILLIGRAM(S): at 05:57

## 2017-03-05 RX ADMIN — Medication 1 TABLET(S): at 12:45

## 2017-03-05 RX ADMIN — NYSTATIN CREAM 1 APPLICATION(S): 100000 CREAM TOPICAL at 21:35

## 2017-03-05 RX ADMIN — Medication 3 MILLILITER(S): at 20:48

## 2017-03-05 RX ADMIN — Medication 500000 UNIT(S): at 05:58

## 2017-03-05 RX ADMIN — NYSTATIN CREAM 1 APPLICATION(S): 100000 CREAM TOPICAL at 05:57

## 2017-03-05 RX ADMIN — Medication 20 MILLIEQUIVALENT(S): at 12:45

## 2017-03-05 RX ADMIN — POLYETHYLENE GLYCOL 3350 17 GRAM(S): 17 POWDER, FOR SOLUTION ORAL at 17:16

## 2017-03-05 RX ADMIN — Medication 500000 UNIT(S): at 23:17

## 2017-03-05 RX ADMIN — PANTOPRAZOLE SODIUM 40 MILLIGRAM(S): 20 TABLET, DELAYED RELEASE ORAL at 06:00

## 2017-03-05 RX ADMIN — PIPERACILLIN AND TAZOBACTAM 25 GRAM(S): 4; .5 INJECTION, POWDER, LYOPHILIZED, FOR SOLUTION INTRAVENOUS at 21:34

## 2017-03-05 RX ADMIN — PIPERACILLIN AND TAZOBACTAM 25 GRAM(S): 4; .5 INJECTION, POWDER, LYOPHILIZED, FOR SOLUTION INTRAVENOUS at 14:26

## 2017-03-05 RX ADMIN — Medication 0.5 MILLIGRAM(S): at 07:30

## 2017-03-06 LAB
ANION GAP SERPL CALC-SCNC: 9 MMOL/L — SIGNIFICANT CHANGE UP (ref 5–17)
BUN SERPL-MCNC: 36 MG/DL — HIGH (ref 7–23)
CALCIUM SERPL-MCNC: 9.1 MG/DL — SIGNIFICANT CHANGE UP (ref 8.5–10.1)
CHLORIDE SERPL-SCNC: 84 MMOL/L — LOW (ref 96–108)
CO2 SERPL-SCNC: 42 MMOL/L — HIGH (ref 22–31)
CREAT SERPL-MCNC: 1.5 MG/DL — HIGH (ref 0.5–1.3)
CULTURE RESULTS: SIGNIFICANT CHANGE UP
CULTURE RESULTS: SIGNIFICANT CHANGE UP
GLUCOSE SERPL-MCNC: 144 MG/DL — HIGH (ref 70–99)
HCT VFR BLD CALC: 31.8 % — LOW (ref 34.5–45)
HGB BLD-MCNC: 10.2 G/DL — LOW (ref 11.5–15.5)
INR BLD: 3.3 RATIO — HIGH (ref 0.88–1.16)
MAGNESIUM SERPL-MCNC: 2.3 MG/DL — SIGNIFICANT CHANGE UP (ref 1.8–2.4)
MCHC RBC-ENTMCNC: 32 GM/DL — SIGNIFICANT CHANGE UP (ref 32–36)
MCHC RBC-ENTMCNC: 32.8 PG — SIGNIFICANT CHANGE UP (ref 27–34)
MCV RBC AUTO: 102.4 FL — HIGH (ref 80–100)
PHOSPHATE SERPL-MCNC: 3.6 MG/DL — SIGNIFICANT CHANGE UP (ref 2.5–4.5)
PLATELET # BLD AUTO: 427 K/UL — HIGH (ref 150–400)
POTASSIUM SERPL-MCNC: 3.6 MMOL/L — SIGNIFICANT CHANGE UP (ref 3.5–5.3)
POTASSIUM SERPL-SCNC: 3.6 MMOL/L — SIGNIFICANT CHANGE UP (ref 3.5–5.3)
PROTHROM AB SERPL-ACNC: 37.1 SEC — HIGH (ref 10–13.1)
RBC # BLD: 3.1 M/UL — LOW (ref 3.8–5.2)
RBC # FLD: 18.8 % — HIGH (ref 10.3–14.5)
SODIUM SERPL-SCNC: 135 MMOL/L — SIGNIFICANT CHANGE UP (ref 135–145)
SPECIMEN SOURCE: SIGNIFICANT CHANGE UP
SPECIMEN SOURCE: SIGNIFICANT CHANGE UP
WBC # BLD: 23.2 K/UL — HIGH (ref 3.8–10.5)
WBC # FLD AUTO: 23.2 K/UL — HIGH (ref 3.8–10.5)

## 2017-03-06 PROCEDURE — 99291 CRITICAL CARE FIRST HOUR: CPT

## 2017-03-06 RX ADMIN — Medication 1 PATCH: at 12:56

## 2017-03-06 RX ADMIN — NYSTATIN CREAM 1 APPLICATION(S): 100000 CREAM TOPICAL at 14:15

## 2017-03-06 RX ADMIN — Medication 3 MILLILITER(S): at 19:52

## 2017-03-06 RX ADMIN — PIPERACILLIN AND TAZOBACTAM 25 GRAM(S): 4; .5 INJECTION, POWDER, LYOPHILIZED, FOR SOLUTION INTRAVENOUS at 06:00

## 2017-03-06 RX ADMIN — Medication 100 MILLIGRAM(S): at 22:55

## 2017-03-06 RX ADMIN — Medication 3 MILLILITER(S): at 15:05

## 2017-03-06 RX ADMIN — Medication 3 MILLILITER(S): at 11:53

## 2017-03-06 RX ADMIN — NYSTATIN CREAM 1 APPLICATION(S): 100000 CREAM TOPICAL at 22:55

## 2017-03-06 RX ADMIN — Medication 3 MILLILITER(S): at 11:52

## 2017-03-06 RX ADMIN — Medication 3 MILLILITER(S): at 03:20

## 2017-03-06 RX ADMIN — Medication 20 MILLIGRAM(S): at 06:00

## 2017-03-06 RX ADMIN — Medication 3 MILLILITER(S): at 08:30

## 2017-03-06 RX ADMIN — POLYETHYLENE GLYCOL 3350 17 GRAM(S): 17 POWDER, FOR SOLUTION ORAL at 17:13

## 2017-03-06 RX ADMIN — Medication 500000 UNIT(S): at 17:12

## 2017-03-06 RX ADMIN — Medication 500000 UNIT(S): at 06:00

## 2017-03-06 RX ADMIN — Medication 100 MILLIGRAM(S): at 14:14

## 2017-03-06 RX ADMIN — Medication 81 MILLIGRAM(S): at 12:56

## 2017-03-06 RX ADMIN — Medication 500000 UNIT(S): at 23:34

## 2017-03-06 RX ADMIN — Medication 1 TABLET(S): at 12:56

## 2017-03-06 RX ADMIN — POLYETHYLENE GLYCOL 3350 17 GRAM(S): 17 POWDER, FOR SOLUTION ORAL at 06:09

## 2017-03-06 RX ADMIN — Medication 3 MILLILITER(S): at 23:49

## 2017-03-06 RX ADMIN — Medication 150 MILLIGRAM(S): at 17:14

## 2017-03-06 RX ADMIN — Medication 500000 UNIT(S): at 12:56

## 2017-03-06 RX ADMIN — PIPERACILLIN AND TAZOBACTAM 25 GRAM(S): 4; .5 INJECTION, POWDER, LYOPHILIZED, FOR SOLUTION INTRAVENOUS at 22:55

## 2017-03-06 RX ADMIN — Medication 100 MILLIGRAM(S): at 06:00

## 2017-03-06 RX ADMIN — Medication 30 MILLILITER(S): at 14:24

## 2017-03-06 RX ADMIN — SENNA PLUS 2 TABLET(S): 8.6 TABLET ORAL at 22:55

## 2017-03-06 RX ADMIN — Medication 40 MILLIGRAM(S): at 06:00

## 2017-03-06 RX ADMIN — Medication 20 MILLIGRAM(S): at 17:12

## 2017-03-06 RX ADMIN — Medication 0.5 MILLIGRAM(S): at 08:33

## 2017-03-06 RX ADMIN — NYSTATIN CREAM 1 APPLICATION(S): 100000 CREAM TOPICAL at 06:02

## 2017-03-06 RX ADMIN — PIPERACILLIN AND TAZOBACTAM 25 GRAM(S): 4; .5 INJECTION, POWDER, LYOPHILIZED, FOR SOLUTION INTRAVENOUS at 14:14

## 2017-03-06 RX ADMIN — Medication 1 PATCH: at 12:57

## 2017-03-06 RX ADMIN — PANTOPRAZOLE SODIUM 40 MILLIGRAM(S): 20 TABLET, DELAYED RELEASE ORAL at 07:46

## 2017-03-06 RX ADMIN — Medication 0.5 MILLIGRAM(S): at 19:52

## 2017-03-07 LAB
ANION GAP SERPL CALC-SCNC: 8 MMOL/L — SIGNIFICANT CHANGE UP (ref 5–17)
BUN SERPL-MCNC: 37 MG/DL — HIGH (ref 7–23)
CALCIUM SERPL-MCNC: 9.4 MG/DL — SIGNIFICANT CHANGE UP (ref 8.5–10.1)
CHLORIDE SERPL-SCNC: 84 MMOL/L — LOW (ref 96–108)
CO2 SERPL-SCNC: 43 MMOL/L — HIGH (ref 22–31)
CREAT SERPL-MCNC: 1.5 MG/DL — HIGH (ref 0.5–1.3)
GLUCOSE SERPL-MCNC: 124 MG/DL — HIGH (ref 70–99)
HCT VFR BLD CALC: 30.5 % — LOW (ref 34.5–45)
HGB BLD-MCNC: 9.8 G/DL — LOW (ref 11.5–15.5)
INR BLD: 3.47 RATIO — HIGH (ref 0.88–1.16)
MAGNESIUM SERPL-MCNC: 2.6 MG/DL — HIGH (ref 1.8–2.4)
MCHC RBC-ENTMCNC: 32.1 GM/DL — SIGNIFICANT CHANGE UP (ref 32–36)
MCHC RBC-ENTMCNC: 32.3 PG — SIGNIFICANT CHANGE UP (ref 27–34)
MCV RBC AUTO: 100.7 FL — HIGH (ref 80–100)
PHOSPHATE SERPL-MCNC: 2.9 MG/DL — SIGNIFICANT CHANGE UP (ref 2.5–4.5)
PLATELET # BLD AUTO: 453 K/UL — HIGH (ref 150–400)
POTASSIUM SERPL-MCNC: 3.9 MMOL/L — SIGNIFICANT CHANGE UP (ref 3.5–5.3)
POTASSIUM SERPL-SCNC: 3.9 MMOL/L — SIGNIFICANT CHANGE UP (ref 3.5–5.3)
PROTHROM AB SERPL-ACNC: 39 SEC — HIGH (ref 10–13.1)
RBC # BLD: 3.03 M/UL — LOW (ref 3.8–5.2)
RBC # FLD: 18.2 % — HIGH (ref 10.3–14.5)
SODIUM SERPL-SCNC: 135 MMOL/L — SIGNIFICANT CHANGE UP (ref 135–145)
VANCOMYCIN TROUGH SERPL-MCNC: 14.5 UG/ML — SIGNIFICANT CHANGE UP (ref 10–20)
WBC # BLD: 29.9 K/UL — HIGH (ref 3.8–10.5)
WBC # FLD AUTO: 29.9 K/UL — HIGH (ref 3.8–10.5)

## 2017-03-07 PROCEDURE — 99291 CRITICAL CARE FIRST HOUR: CPT

## 2017-03-07 RX ADMIN — Medication 3 MILLILITER(S): at 19:18

## 2017-03-07 RX ADMIN — Medication 150 MILLIGRAM(S): at 17:34

## 2017-03-07 RX ADMIN — PIPERACILLIN AND TAZOBACTAM 25 GRAM(S): 4; .5 INJECTION, POWDER, LYOPHILIZED, FOR SOLUTION INTRAVENOUS at 06:43

## 2017-03-07 RX ADMIN — Medication 3 MILLILITER(S): at 08:23

## 2017-03-07 RX ADMIN — PIPERACILLIN AND TAZOBACTAM 25 GRAM(S): 4; .5 INJECTION, POWDER, LYOPHILIZED, FOR SOLUTION INTRAVENOUS at 15:47

## 2017-03-07 RX ADMIN — Medication 81 MILLIGRAM(S): at 12:08

## 2017-03-07 RX ADMIN — Medication 0.5 MILLIGRAM(S): at 08:22

## 2017-03-07 RX ADMIN — Medication 500000 UNIT(S): at 06:43

## 2017-03-07 RX ADMIN — Medication 3 MILLILITER(S): at 16:28

## 2017-03-07 RX ADMIN — Medication 1 PATCH: at 12:08

## 2017-03-07 RX ADMIN — Medication 0.5 MILLIGRAM(S): at 19:18

## 2017-03-07 RX ADMIN — PANTOPRAZOLE SODIUM 40 MILLIGRAM(S): 20 TABLET, DELAYED RELEASE ORAL at 08:11

## 2017-03-07 RX ADMIN — NYSTATIN CREAM 1 APPLICATION(S): 100000 CREAM TOPICAL at 15:45

## 2017-03-07 RX ADMIN — Medication 1 TABLET(S): at 12:07

## 2017-03-07 RX ADMIN — NYSTATIN CREAM 1 APPLICATION(S): 100000 CREAM TOPICAL at 06:43

## 2017-03-07 RX ADMIN — POLYETHYLENE GLYCOL 3350 17 GRAM(S): 17 POWDER, FOR SOLUTION ORAL at 06:43

## 2017-03-07 RX ADMIN — Medication 40 MILLIGRAM(S): at 06:43

## 2017-03-07 RX ADMIN — Medication 3 MILLILITER(S): at 03:26

## 2017-03-07 RX ADMIN — Medication 500000 UNIT(S): at 17:33

## 2017-03-07 RX ADMIN — Medication 20 MILLIGRAM(S): at 06:43

## 2017-03-07 RX ADMIN — Medication 3 MILLILITER(S): at 22:08

## 2017-03-07 RX ADMIN — Medication 3 MILLILITER(S): at 11:40

## 2017-03-07 RX ADMIN — NYSTATIN CREAM 1 APPLICATION(S): 100000 CREAM TOPICAL at 22:41

## 2017-03-07 RX ADMIN — Medication 1 TABLET(S): at 12:08

## 2017-03-07 RX ADMIN — Medication 3 MILLILITER(S): at 08:22

## 2017-03-07 RX ADMIN — Medication 100 MILLIGRAM(S): at 22:40

## 2017-03-07 RX ADMIN — SENNA PLUS 2 TABLET(S): 8.6 TABLET ORAL at 22:40

## 2017-03-07 RX ADMIN — POLYETHYLENE GLYCOL 3350 17 GRAM(S): 17 POWDER, FOR SOLUTION ORAL at 17:33

## 2017-03-07 RX ADMIN — PIPERACILLIN AND TAZOBACTAM 25 GRAM(S): 4; .5 INJECTION, POWDER, LYOPHILIZED, FOR SOLUTION INTRAVENOUS at 22:40

## 2017-03-07 RX ADMIN — Medication 500000 UNIT(S): at 12:07

## 2017-03-07 RX ADMIN — Medication 500000 UNIT(S): at 23:47

## 2017-03-07 RX ADMIN — Medication 100 MILLIGRAM(S): at 06:44

## 2017-03-08 VITALS
RESPIRATION RATE: 19 BRPM | OXYGEN SATURATION: 67 % | SYSTOLIC BLOOD PRESSURE: 110 MMHG | HEART RATE: 138 BPM | DIASTOLIC BLOOD PRESSURE: 56 MMHG

## 2017-03-08 LAB
ANION GAP SERPL CALC-SCNC: 9 MMOL/L — SIGNIFICANT CHANGE UP (ref 5–17)
BUN SERPL-MCNC: 38 MG/DL — HIGH (ref 7–23)
CALCIUM SERPL-MCNC: 9.4 MG/DL — SIGNIFICANT CHANGE UP (ref 8.5–10.1)
CHLORIDE SERPL-SCNC: 82 MMOL/L — LOW (ref 96–108)
CO2 SERPL-SCNC: 44 MMOL/L — HIGH (ref 22–31)
CREAT SERPL-MCNC: 1.5 MG/DL — HIGH (ref 0.5–1.3)
GLUCOSE SERPL-MCNC: 90 MG/DL — SIGNIFICANT CHANGE UP (ref 70–99)
HCT VFR BLD CALC: 31.8 % — LOW (ref 34.5–45)
HGB BLD-MCNC: 9.8 G/DL — LOW (ref 11.5–15.5)
MAGNESIUM SERPL-MCNC: 2.4 MG/DL — SIGNIFICANT CHANGE UP (ref 1.8–2.4)
MCHC RBC-ENTMCNC: 30.8 GM/DL — LOW (ref 32–36)
MCHC RBC-ENTMCNC: 32.1 PG — SIGNIFICANT CHANGE UP (ref 27–34)
MCV RBC AUTO: 104.2 FL — HIGH (ref 80–100)
PHOSPHATE SERPL-MCNC: 3.2 MG/DL — SIGNIFICANT CHANGE UP (ref 2.5–4.5)
PLATELET # BLD AUTO: 436 K/UL — HIGH (ref 150–400)
POTASSIUM SERPL-MCNC: 3.8 MMOL/L — SIGNIFICANT CHANGE UP (ref 3.5–5.3)
POTASSIUM SERPL-SCNC: 3.8 MMOL/L — SIGNIFICANT CHANGE UP (ref 3.5–5.3)
RBC # BLD: 3.06 M/UL — LOW (ref 3.8–5.2)
RBC # FLD: 18.3 % — HIGH (ref 10.3–14.5)
SODIUM SERPL-SCNC: 135 MMOL/L — SIGNIFICANT CHANGE UP (ref 135–145)
WBC # BLD: 26.9 K/UL — HIGH (ref 3.8–10.5)
WBC # FLD AUTO: 26.9 K/UL — HIGH (ref 3.8–10.5)

## 2017-03-08 PROCEDURE — 97530 THERAPEUTIC ACTIVITIES: CPT

## 2017-03-08 PROCEDURE — 97162 PT EVAL MOD COMPLEX 30 MIN: CPT

## 2017-03-08 PROCEDURE — 99221 1ST HOSP IP/OBS SF/LOW 40: CPT

## 2017-03-08 PROCEDURE — 85610 PROTHROMBIN TIME: CPT

## 2017-03-08 PROCEDURE — 71250 CT THORAX DX C-: CPT

## 2017-03-08 PROCEDURE — 80053 COMPREHEN METABOLIC PANEL: CPT

## 2017-03-08 PROCEDURE — 99285 EMERGENCY DEPT VISIT HI MDM: CPT | Mod: 25

## 2017-03-08 PROCEDURE — 87040 BLOOD CULTURE FOR BACTERIA: CPT

## 2017-03-08 PROCEDURE — 87798 DETECT AGENT NOS DNA AMP: CPT

## 2017-03-08 PROCEDURE — 82570 ASSAY OF URINE CREATININE: CPT

## 2017-03-08 PROCEDURE — 94660 CPAP INITIATION&MGMT: CPT

## 2017-03-08 PROCEDURE — 87449 NOS EACH ORGANISM AG IA: CPT

## 2017-03-08 PROCEDURE — 36600 WITHDRAWAL OF ARTERIAL BLOOD: CPT

## 2017-03-08 PROCEDURE — 80061 LIPID PANEL: CPT

## 2017-03-08 PROCEDURE — 87086 URINE CULTURE/COLONY COUNT: CPT

## 2017-03-08 PROCEDURE — 93306 TTE W/DOPPLER COMPLETE: CPT

## 2017-03-08 PROCEDURE — 85730 THROMBOPLASTIN TIME PARTIAL: CPT

## 2017-03-08 PROCEDURE — 96367 TX/PROPH/DG ADDL SEQ IV INF: CPT

## 2017-03-08 PROCEDURE — 87070 CULTURE OTHR SPECIMN AEROBIC: CPT

## 2017-03-08 PROCEDURE — 83036 HEMOGLOBIN GLYCOSYLATED A1C: CPT

## 2017-03-08 PROCEDURE — 87186 SC STD MICRODIL/AGAR DIL: CPT

## 2017-03-08 PROCEDURE — 82803 BLOOD GASES ANY COMBINATION: CPT

## 2017-03-08 PROCEDURE — 82553 CREATINE MB FRACTION: CPT

## 2017-03-08 PROCEDURE — 87581 M.PNEUMON DNA AMP PROBE: CPT

## 2017-03-08 PROCEDURE — 80202 ASSAY OF VANCOMYCIN: CPT

## 2017-03-08 PROCEDURE — 96375 TX/PRO/DX INJ NEW DRUG ADDON: CPT

## 2017-03-08 PROCEDURE — 84484 ASSAY OF TROPONIN QUANT: CPT

## 2017-03-08 PROCEDURE — 97116 GAIT TRAINING THERAPY: CPT

## 2017-03-08 PROCEDURE — 80048 BASIC METABOLIC PNL TOTAL CA: CPT

## 2017-03-08 PROCEDURE — 94002 VENT MGMT INPAT INIT DAY: CPT

## 2017-03-08 PROCEDURE — 93005 ELECTROCARDIOGRAM TRACING: CPT

## 2017-03-08 PROCEDURE — 96365 THER/PROPH/DIAG IV INF INIT: CPT

## 2017-03-08 PROCEDURE — 81001 URINALYSIS AUTO W/SCOPE: CPT

## 2017-03-08 PROCEDURE — 87486 CHLMYD PNEUM DNA AMP PROBE: CPT

## 2017-03-08 PROCEDURE — 99233 SBSQ HOSP IP/OBS HIGH 50: CPT

## 2017-03-08 PROCEDURE — 83735 ASSAY OF MAGNESIUM: CPT

## 2017-03-08 PROCEDURE — 82550 ASSAY OF CK (CPK): CPT

## 2017-03-08 PROCEDURE — 87633 RESP VIRUS 12-25 TARGETS: CPT

## 2017-03-08 PROCEDURE — 99231 SBSQ HOSP IP/OBS SF/LOW 25: CPT

## 2017-03-08 PROCEDURE — 94003 VENT MGMT INPAT SUBQ DAY: CPT

## 2017-03-08 PROCEDURE — 31720 CLEARANCE OF AIRWAYS: CPT

## 2017-03-08 PROCEDURE — 83605 ASSAY OF LACTIC ACID: CPT

## 2017-03-08 PROCEDURE — 84300 ASSAY OF URINE SODIUM: CPT

## 2017-03-08 PROCEDURE — 84540 ASSAY OF URINE/UREA-N: CPT

## 2017-03-08 PROCEDURE — 94664 DEMO&/EVAL PT USE INHALER: CPT

## 2017-03-08 PROCEDURE — 83880 ASSAY OF NATRIURETIC PEPTIDE: CPT

## 2017-03-08 PROCEDURE — 83690 ASSAY OF LIPASE: CPT

## 2017-03-08 PROCEDURE — 94640 AIRWAY INHALATION TREATMENT: CPT

## 2017-03-08 PROCEDURE — 94760 N-INVAS EAR/PLS OXIMETRY 1: CPT

## 2017-03-08 PROCEDURE — 85027 COMPLETE CBC AUTOMATED: CPT

## 2017-03-08 PROCEDURE — 84100 ASSAY OF PHOSPHORUS: CPT

## 2017-03-08 PROCEDURE — 71045 X-RAY EXAM CHEST 1 VIEW: CPT

## 2017-03-08 RX ORDER — PANTOPRAZOLE SODIUM 20 MG/1
40 TABLET, DELAYED RELEASE ORAL DAILY
Qty: 0 | Refills: 0 | Status: DISCONTINUED | OUTPATIENT
Start: 2017-03-08 | End: 2017-03-08

## 2017-03-08 RX ORDER — MORPHINE SULFATE 50 MG/1
2 CAPSULE, EXTENDED RELEASE ORAL
Qty: 0 | Refills: 0 | Status: DISCONTINUED | OUTPATIENT
Start: 2017-03-08 | End: 2017-03-08

## 2017-03-08 RX ADMIN — Medication 3 MILLILITER(S): at 08:02

## 2017-03-08 RX ADMIN — Medication 150 MILLIGRAM(S): at 16:30

## 2017-03-08 RX ADMIN — Medication 40 MILLIGRAM(S): at 06:18

## 2017-03-08 RX ADMIN — MORPHINE SULFATE 2 MILLIGRAM(S): 50 CAPSULE, EXTENDED RELEASE ORAL at 09:00

## 2017-03-08 RX ADMIN — MORPHINE SULFATE 2 MILLIGRAM(S): 50 CAPSULE, EXTENDED RELEASE ORAL at 14:08

## 2017-03-08 RX ADMIN — Medication 1 PATCH: at 12:45

## 2017-03-08 RX ADMIN — NYSTATIN CREAM 1 APPLICATION(S): 100000 CREAM TOPICAL at 06:19

## 2017-03-08 RX ADMIN — MORPHINE SULFATE 2 MILLIGRAM(S): 50 CAPSULE, EXTENDED RELEASE ORAL at 21:02

## 2017-03-08 RX ADMIN — Medication 3 MILLILITER(S): at 02:00

## 2017-03-08 RX ADMIN — MORPHINE SULFATE 2 MILLIGRAM(S): 50 CAPSULE, EXTENDED RELEASE ORAL at 09:07

## 2017-03-08 RX ADMIN — NYSTATIN CREAM 1 APPLICATION(S): 100000 CREAM TOPICAL at 14:09

## 2017-03-08 RX ADMIN — Medication 100 MILLIGRAM(S): at 06:18

## 2017-03-08 RX ADMIN — Medication 500000 UNIT(S): at 06:21

## 2017-03-08 RX ADMIN — MORPHINE SULFATE 2 MILLIGRAM(S): 50 CAPSULE, EXTENDED RELEASE ORAL at 14:09

## 2017-03-08 RX ADMIN — MORPHINE SULFATE 2 MILLIGRAM(S): 50 CAPSULE, EXTENDED RELEASE ORAL at 19:39

## 2017-03-08 RX ADMIN — Medication 1 PATCH: at 12:35

## 2017-03-08 RX ADMIN — PANTOPRAZOLE SODIUM 40 MILLIGRAM(S): 20 TABLET, DELAYED RELEASE ORAL at 12:35

## 2017-03-08 RX ADMIN — Medication 0.5 MILLIGRAM(S): at 08:02

## 2017-03-08 NOTE — DISCHARGE NOTE FOR THE EXPIRED PATIENT - HOSPITAL COURSE
82 f from [facility] current everyday smoker HO COPD HO A fib on coumadin was admitted [facility] ICU 2017 with severe hypoxia and whiteout of left lung   SMOKER  Nicotine 21 ()   ACUTE SEVERE HYPOXEMIC RESP FAILURE INTUBATED  Extubated ()   Remains on Hi Silvestre hi FIO2 O2   .4 746/62/41   PNEUMONIA  3/1-3/2-3/3-3/4-3/5-3/6-3/7-3/8 W 16.2-12.4-12.3-15.7-15.5-23.2-29.9-26.9   Sp C MRSA  3/1 bc n   CXR basal opac 3/5 CXR basal opac nsc 3/7 Vanco t 14.5   On  Zosyn (3/1-3/7) VANCO 750 (3/6)   COPD  On Duoneb.6 (3/4)  Pulmicort ()  mucomyst .6 (3/4) Pred 40 (3/7)  HEMODYNAMIC INSTABILITY  Midodrine 10.3 ()   ATELECTASIS L LUNG POA  CXR Incr basl markings opac  L lung aeration improved   CXR basal opac   A FIB ON COUMADIN POA    Rate controlled On Cardizem 60.4 ()  On ASA 81 ()  coumadin  COAGULATION PROFILE  3/1-3/2-3/3-3/-3/6 -166 -189-294-330  FLYNN - Tr 1 n.2   CHF   ECHO EF 45%  On Lasix 40 () KCL 40 (3/2)   JORDYN  3/-3/2-3/3-3/5-3/-3/8 CO2 06-09-18-41-42-44 3/-3/2-3/3-3/5-3/-3/8  Cr 1.3-1.4-1.5-1.4-1.5-1.5  Stable   DIABETES  HbA1C 6.1  g 179  SUP Protonix 40 ()  DIET  3/4  DIET Dys 1 Puree Nectar Ensure bid (3/4)   Condition progressively deteriorated and she  with progressive hypoxic resp failure

## 2017-03-09 RX ORDER — PANTOPRAZOLE SODIUM 20 MG/1
1 TABLET, DELAYED RELEASE ORAL
Qty: 0 | Refills: 0 | COMMUNITY

## 2017-03-09 RX ORDER — NICOTINE POLACRILEX 2 MG
1 GUM BUCCAL
Qty: 0 | Refills: 0 | COMMUNITY

## 2017-03-09 RX ORDER — ASPIRIN/CALCIUM CARB/MAGNESIUM 324 MG
1 TABLET ORAL
Qty: 0 | Refills: 0 | COMMUNITY

## 2017-03-09 RX ORDER — CALCIUM CARBONATE 500(1250)
1 TABLET ORAL
Qty: 0 | Refills: 0 | COMMUNITY

## 2017-03-09 RX ORDER — ACETAMINOPHEN 500 MG
2 TABLET ORAL
Qty: 0 | Refills: 0 | COMMUNITY

## 2017-03-09 RX ORDER — ALBUTEROL 90 UG/1
3 AEROSOL, METERED ORAL
Qty: 0 | Refills: 0 | COMMUNITY

## 2017-03-09 RX ORDER — DOCUSATE SODIUM 100 MG
2 CAPSULE ORAL
Qty: 0 | Refills: 0 | COMMUNITY

## 2017-03-09 RX ORDER — ERGOCALCIFEROL 1.25 MG/1
2000 CAPSULE ORAL
Qty: 0 | Refills: 0 | COMMUNITY

## 2017-03-09 RX ORDER — WARFARIN SODIUM 2.5 MG/1
1 TABLET ORAL
Qty: 0 | Refills: 0 | COMMUNITY

## 2017-03-09 RX ORDER — MULTIVIT-MIN/FERROUS GLUCONATE 9 MG/15 ML
1 LIQUID (ML) ORAL
Qty: 0 | Refills: 0 | COMMUNITY

## 2017-03-09 RX ORDER — IPRATROPIUM BROMIDE 0.2 MG/ML
1 SOLUTION, NON-ORAL INHALATION
Qty: 0 | Refills: 0 | COMMUNITY

## 2017-03-10 DIAGNOSIS — I08.1 RHEUMATIC DISORDERS OF BOTH MITRAL AND TRICUSPID VALVES: ICD-10-CM

## 2017-03-10 DIAGNOSIS — K59.00 CONSTIPATION, UNSPECIFIED: ICD-10-CM

## 2017-03-10 DIAGNOSIS — E83.39 OTHER DISORDERS OF PHOSPHORUS METABOLISM: ICD-10-CM

## 2017-03-10 DIAGNOSIS — F03.90 UNSPECIFIED DEMENTIA, UNSPECIFIED SEVERITY, WITHOUT BEHAVIORAL DISTURBANCE, PSYCHOTIC DISTURBANCE, MOOD DISTURBANCE, AND ANXIETY: ICD-10-CM

## 2017-03-10 DIAGNOSIS — J96.01 ACUTE RESPIRATORY FAILURE WITH HYPOXIA: ICD-10-CM

## 2017-03-10 DIAGNOSIS — N17.9 ACUTE KIDNEY FAILURE, UNSPECIFIED: ICD-10-CM

## 2017-03-10 DIAGNOSIS — Z79.82 LONG TERM (CURRENT) USE OF ASPIRIN: ICD-10-CM

## 2017-03-10 DIAGNOSIS — I48.91 UNSPECIFIED ATRIAL FIBRILLATION: ICD-10-CM

## 2017-03-10 DIAGNOSIS — I13.0 HYPERTENSIVE HEART AND CHRONIC KIDNEY DISEASE WITH HEART FAILURE AND STAGE 1 THROUGH STAGE 4 CHRONIC KIDNEY DISEASE, OR UNSPECIFIED CHRONIC KIDNEY DISEASE: ICD-10-CM

## 2017-03-10 DIAGNOSIS — J98.11 ATELECTASIS: ICD-10-CM

## 2017-03-10 DIAGNOSIS — Z96.642 PRESENCE OF LEFT ARTIFICIAL HIP JOINT: ICD-10-CM

## 2017-03-10 DIAGNOSIS — Z78.1 PHYSICAL RESTRAINT STATUS: ICD-10-CM

## 2017-03-10 DIAGNOSIS — R65.21 SEVERE SEPSIS WITH SEPTIC SHOCK: ICD-10-CM

## 2017-03-10 DIAGNOSIS — E11.622 TYPE 2 DIABETES MELLITUS WITH OTHER SKIN ULCER: ICD-10-CM

## 2017-03-10 DIAGNOSIS — D64.9 ANEMIA, UNSPECIFIED: ICD-10-CM

## 2017-03-10 DIAGNOSIS — L98.419 NON-PRESSURE CHRONIC ULCER OF BUTTOCK WITH UNSPECIFIED SEVERITY: ICD-10-CM

## 2017-03-10 DIAGNOSIS — E83.41 HYPERMAGNESEMIA: ICD-10-CM

## 2017-03-10 DIAGNOSIS — N18.9 CHRONIC KIDNEY DISEASE, UNSPECIFIED: ICD-10-CM

## 2017-03-10 DIAGNOSIS — Z87.81 PERSONAL HISTORY OF (HEALED) TRAUMATIC FRACTURE: ICD-10-CM

## 2017-03-10 DIAGNOSIS — Z99.11 DEPENDENCE ON RESPIRATOR [VENTILATOR] STATUS: ICD-10-CM

## 2017-03-10 DIAGNOSIS — R26.2 DIFFICULTY IN WALKING, NOT ELSEWHERE CLASSIFIED: ICD-10-CM

## 2017-03-10 DIAGNOSIS — Z79.01 LONG TERM (CURRENT) USE OF ANTICOAGULANTS: ICD-10-CM

## 2017-03-10 DIAGNOSIS — A41.02 SEPSIS DUE TO METHICILLIN RESISTANT STAPHYLOCOCCUS AUREUS: ICD-10-CM

## 2017-03-10 DIAGNOSIS — Z53.29 PROCEDURE AND TREATMENT NOT CARRIED OUT BECAUSE OF PATIENT'S DECISION FOR OTHER REASONS: ICD-10-CM

## 2017-03-10 DIAGNOSIS — J96.02 ACUTE RESPIRATORY FAILURE WITH HYPERCAPNIA: ICD-10-CM

## 2017-03-10 DIAGNOSIS — B37.0 CANDIDAL STOMATITIS: ICD-10-CM

## 2017-03-10 DIAGNOSIS — G93.40 ENCEPHALOPATHY, UNSPECIFIED: ICD-10-CM

## 2017-03-10 DIAGNOSIS — Z66 DO NOT RESUSCITATE: ICD-10-CM

## 2017-03-10 DIAGNOSIS — I50.42 CHRONIC COMBINED SYSTOLIC (CONGESTIVE) AND DIASTOLIC (CONGESTIVE) HEART FAILURE: ICD-10-CM

## 2017-03-10 DIAGNOSIS — J69.0 PNEUMONITIS DUE TO INHALATION OF FOOD AND VOMIT: ICD-10-CM

## 2017-03-10 DIAGNOSIS — K21.9 GASTRO-ESOPHAGEAL REFLUX DISEASE WITHOUT ESOPHAGITIS: ICD-10-CM

## 2017-03-10 DIAGNOSIS — F17.210 NICOTINE DEPENDENCE, CIGARETTES, UNCOMPLICATED: ICD-10-CM

## 2017-03-10 DIAGNOSIS — E11.22 TYPE 2 DIABETES MELLITUS WITH DIABETIC CHRONIC KIDNEY DISEASE: ICD-10-CM

## 2017-03-10 DIAGNOSIS — J44.9 CHRONIC OBSTRUCTIVE PULMONARY DISEASE, UNSPECIFIED: ICD-10-CM

## 2017-03-10 DIAGNOSIS — M25.652 STIFFNESS OF LEFT HIP, NOT ELSEWHERE CLASSIFIED: ICD-10-CM

## 2017-03-10 DIAGNOSIS — E87.6 HYPOKALEMIA: ICD-10-CM

## 2019-05-06 NOTE — DISCHARGE NOTE ADULT - SMOKING EVEN A SINGLE PUFF INCREASES THE LIKELIHOOD OF A FULL RELAPSE, WITHDRAWAL SYMPTOMS PEAK WITHIN 1-2 WEEKS, BUT CAN PERSIST FOR MONTHS
Al states he will be getting a hold of Dr. Buenrostro on Tuesday to give the fax number for results he is requesting. Al is aware Dr. Buenrsotro was out of clinic today.  
Statement Selected

## 2019-07-10 NOTE — PATIENT PROFILE ADULT. - FUNCTIONAL SCREEN CURRENT LEVEL: DRESSING, MLM
Radha Bhagat)  Internal Medicine  [address] [place] [address]  Phone: [phone]  Fax: [phone]  Follow Up Time: 4-6 Days (2) assistive person

## 2020-01-21 NOTE — H&P ADULT - NSHPSOCIALHISTORY_GEN_ALL_CORE
INR (no units)   Date Value   01/21/2020 1.4     INR is below goal range.  Reviewed INR result and patient findings with Dr Reid and verbal order received.   Spoke with patient via phone.  Current warfarin dosing verified with patient, informed of INR result and obtained patient findings.   Patient findings are all negative.Patient instructed that warfarin dose will be increased.  Discussed return date for next INR. See AntiCoag Tracker for details.    Patient was instructed to contact the clinic with any unusual bleeding or bruising, any changes in medications, diet, health status, lifestyle, or any other changes, questions or concerns. Patient verbalized understanding of all discussed.   SNF resident

## 2020-03-24 NOTE — PATIENT PROFILE ADULT. - PMH
Yes
Anemia    Atrial fibrillation    Chronic obstructive pulmonary disease, unspecified COPD type    Constipation    Difficulty in walking, not elsewhere classified    GERD without esophagitis    Hypertension    Influenza due to unidentified influenza virus with specified pneumonia    Muscle weakness    Nicotine dependence    Nondisplaced intertrochanteric fracture of left femur, subsequent encounter for closed fracture with routine healing    Pneumonia    Pressure ulcer of unspecified buttock, stage 2    Stiffness of left hip, not elsewhere classified

## 2020-12-07 NOTE — PATIENT PROFILE ADULT. - PACKS PER DAY
Last office visit: 9/4/2020    Next scheduled/on recall list: 6 months CALIN     Medication/dose: dofetilide 250 mcg BID     Quantity/#refills: 180/2    Refill request completed? Yes     0.5

## 2021-05-26 NOTE — ED PROVIDER NOTE - DATE/TIME 1
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 Patient reported that he had been feeling well recently aside from some mild fatigue; however, he did not that his BP has been low lately. He took his BP this morning and it was in the 60s-70s and so he was instructed to go to the ED. Patient reports a low-grade temperature lately. He also endorses a R foot ulcer and some right ankle discomfort for which he has been following up with Podiatry. He denies chest pain, shortness of breath, nausea, vomiting, or abdominal pain. In the ED, patient was found to be hypotensive (SBP 50-90) and tachycardic (HR = 100s-110s). Labs were notable for a downtrending PLT count (8k this AM) and JULIANNE on CKD. R foot X-ray showed a chronic appearing medial angled deformity of the fifth metatarsal neck with erosive appearing changes along the lateral margin which could reflect acute or chronic osteomyelitis. Given patient's worsening thrombocytopenia, Hematology was consulted for further evaluation.      Hematologic/Oncologic History:  Patient has a history of thrombocytopenia dating back to 2003 in the setting of liver disease(when he had AST/ALT abnormalities, but no diagnosis of cirrhosis). This thrombocytopenia has persisted and has been worsening recently. Patient has a history of HCC s/p SIRT in 4/2020. Patient's cirrhosis is suspected to be a main contributor to his thrombocytopenia, likely exacerbated by his splenomegaly and his prior SIRT (evidenced by lower numbers a few months after treatment). However, there also appears to be an ITP component as patient had a BMBx in 8/2020 which showed normal megakaryocytes and he had a slight response to steroids previously. Patient was placed on Nplate in 9/2020 with good response; however, this response has been blunted recently. He last received Nplate on 5/19/21. Patient follows with Dr. Tina Daniel (for HCC) and Dr. Tish Stringer (for thrombocytopenia) at the [facility].    PAST MEDICAL & SURGICAL HISTORY:  AICD lead malfunction  history of - fixed follow-up by Dr Rivers    DM (diabetes mellitus)  type 2    HTN (hypertension)    Thrombocytopenia  Chronic    Coronary artery disease    Pituitary adenoma  as per patient chronic, no changes , recently restarted follow up with endocrinologist ( note in allscripts )    Ischemic cardiomyopathy    Heart failure with reduced left ventricular function  last EF 3--35% 5/2019    Presence of stent in coronary artery  2 stents 1999, 2006    History of osteomyelitis  2015, right foot 2nd toe   follow-up by podiatrist every 2 weeks    Sleep apnea  not using CPAP    ICD (implantable cardioverter-defibrillator) in place  [employer], Old Model [id number] , last interrogation 3/27/19, generator change 4/3/19 New Model # [id number]    Anemia    Hepatic cirrhosis, unspecified hepatic cirrhosis type    History of hyperprolactinemia    PVD (peripheral vascular disease)    Hypothyroidism    Elevated triglycerides with high cholesterol  on meds    Vitamin D deficiency    Elevated prolactin level  dx: &#x27; 70&#x27;s  medically managed Bromocriptine    Gout  medically managed    Liver mass  dx: 10/2018   incidental finding. Currently awaitng furthur workup    Ulcer of right ankle  has surgery done Dec 2018    Afib  pt reports ~9 mos, [id number], resolved - monitored by Dr. Rivers    HCC (hepatocellular carcinoma)  s/p liver embolization x2 in 2019    Coronary atherosclerosis of artery bypass graft  CABG X 4 (1986)    Stented coronary artery  RCA (1999), another  stent 2000    AICD (automatic cardioverter/defibrillator) present  placement in (2006), generator change 4/3/19    History of amputation  right foot, 2nd toe, osteo 2015    Encounter for incision and drainage procedure  Dec 2018 right foot/ankle    SCC (squamous cell carcinoma)  facial SCC, s/p Mohs        Review of Systems:   CONSTITUTIONAL: + Fatigue, No fever or chills  EYES: No eye pain or discharge  ENMT:  No sinus or throat pain  NECK: No pain or stiffness  RESPIRATORY: No shortness of breath or cough  CARDIOVASCULAR: No chest pain or palpitations  GASTROINTESTINAL: No vomiting or abdominal pain  GENITOURINARY: No dysuria or hematuria  NEUROLOGICAL: No headaches or confusion  SKIN: + R foot ulcer, No itching   MUSCULOSKELETAL: No joint pain or back pain    Allergies    No Known Allergies    Intolerances    Social History: No smoking, EtOH, or illicit drug use    FAMILY HISTORY:  Family history of MI (myocardial infarction)        MEDICATIONS  (STANDING):  lactobacillus acidophilus 1 Tablet(s) Oral Once    MEDICATIONS  (PRN):      CAPILLARY BLOOD GLUCOSE        I&O's Summary    Vital Signs Last 24 Hrs  T(C): 36.5 (26 May 2021 09:10), Max: 36.6 (26 May 2021 09:00)  T(F): 97.7 (26 May 2021 09:10), Max: 97.9 (26 May 2021 09:00)  HR: 102 (26 May 2021 10:37) (99 - 113)  BP: 62/40 (26 May 2021 10:37) (51/31 - 99/84)  BP(mean): 46 (26 May 2021 10:37) (46 - 91)  RR: 24 (26 May 2021 10:37) (16 - 24)  SpO2: 97% (26 May 2021 10:37) (96% - 100%)    PHYSICAL EXAM:  GENERAL: NAD  HEENT: NC/AT, MMM  NECK: Supple  CHEST/LUNG: Respirations grossly nonlabored  HEART: RRR; +S1/S2  ABDOMEN: +BS, Soft, NT  EXTREMITIES: No LE edema  NEUROLOGY: Awake and alert, Answering questions and following commands appropriately  SKIN: Warm and dry  PSYCH: Calm and cooperative    LABS:                        10.2   9.00  )-----------( 8        ( 26 May 2021 09:41 )             32.6     05-26    132<L>  |  97  |  128<H>  ----------------------------<  241<H>  4.7   |  14<L>  |  6.96<H>    Ca    9.5      26 May 2021 09:41    TPro  6.8  /  Alb  3.7  /  TBili  1.7<H>  /  DBili  x   /  AST  21  /  ALT  12  /  AlkPhos  83  05-26    PT/INR - ( 26 May 2021 09:41 )   PT: 21.2 sec;   INR: 1.82 ratio         PTT - ( 26 May 2021 09:41 )  PTT:34.1 sec    RADIOLOGY & ADDITIONAL TESTS:  Studies reviewed.   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 Patient reported that he had been feeling well recently aside from some mild fatigue; however, he did not that his BP has been low lately. He took his BP this morning and it was in the 60s-70s and so he was instructed to go to the ED. Patient reports a low-grade temperature lately. He also endorses a R foot ulcer and some right ankle discomfort for which he has been following up with Podiatry. He denies chest pain, shortness of breath, nausea, vomiting, or abdominal pain. In the ED, patient was found to be hypotensive (SBP 50-90) and tachycardic (HR = 100s-110s). Labs were notable for a downtrending PLT count (8k this AM) and JULIANNE on CKD. R foot X-ray showed a chronic appearing medial angled deformity of the fifth metatarsal neck with erosive appearing changes along the lateral margin which could reflect acute or chronic osteomyelitis. Given patient's worsening thrombocytopenia, Hematology was consulted for further evaluation.    Patient was admitted to the MICU. Patient seen while in the MICU. His wife was at bedside. Above history was confirmed. He believes his BP has been lower over the past 10 days. Of note, patient's R foot was debrided ~ 1 week ago. Cultures from this debridement returned positive for MRSA and Enterococcus faecalis (sensitive to Vancomycin). No complaints of chest pain, shortness of breath, or abdominal pain. He denies any recent bleeding episodes.    Hematologic/Oncologic History:  Patient has a history of thrombocytopenia dating back to 2003 in the setting of liver disease(when he had AST/ALT abnormalities, but no diagnosis of cirrhosis). This thrombocytopenia has persisted and has been worsening recently. Patient has a history of HCC s/p SIRT in 4/2020. Patient's cirrhosis is suspected to be a main contributor to his thrombocytopenia, likely exacerbated by his splenomegaly and his prior SIRT (evidenced by lower numbers a few months after treatment). However, there also appears to be an ITP component as patient had a BMBx in 8/2020 which showed normal megakaryocytes and he had a slight response to steroids previously. Patient was placed on Nplate in 9/2020 with good response; however, this response has been blunted recently. He last received Nplate on 5/19/21. Patient follows with Dr. Tina Daniel (for HCC) and Dr. Tish Stringer (for thrombocytopenia) at the [facility].    PAST MEDICAL & SURGICAL HISTORY:  AICD lead malfunction  history of - fixed follow-up by Dr Rivers    DM (diabetes mellitus)  type 2    HTN (hypertension)    Thrombocytopenia  Chronic    Coronary artery disease    Pituitary adenoma  as per patient chronic, no changes , recently restarted follow up with endocrinologist ( note in allscripts )    Ischemic cardiomyopathy    Heart failure with reduced left ventricular function  last EF 3--35% 5/2019    Presence of stent in coronary artery  2 stents 1999, 2006    History of osteomyelitis  2015, right foot 2nd toe   follow-up by podiatrist every 2 weeks    Sleep apnea  not using CPAP    ICD (implantable cardioverter-defibrillator) in place  [employer], Old Model [id number] , last interrogation 3/27/19, generator change 4/3/19 New Model # [id number]    Anemia    Hepatic cirrhosis, unspecified hepatic cirrhosis type    History of hyperprolactinemia    PVD (peripheral vascular disease)    Hypothyroidism    Elevated triglycerides with high cholesterol  on meds    Vitamin D deficiency    Elevated prolactin level  dx: &#x27; 70&#x27;s  medically managed Bromocriptine    Gout  medically managed    Liver mass  dx: 10/2018   incidental finding. Currently awaitng furthur workup    Ulcer of right ankle  has surgery done Dec 2018    Afib  pt reports ~9 mos, [id number], resolved - monitored by Dr. Rivers    HCC (hepatocellular carcinoma)  s/p liver embolization x2 in 2019    Coronary atherosclerosis of artery bypass graft  CABG X 4 (1986)    Stented coronary artery  RCA (1999), another  stent 2000    AICD (automatic cardioverter/defibrillator) present  placement in (2006), generator change 4/3/19    History of amputation  right foot, 2nd toe, osteo 2015    Encounter for incision and drainage procedure  Dec 2018 right foot/ankle    SCC (squamous cell carcinoma)  facial SCC, s/p Mohs        Review of Systems:   CONSTITUTIONAL: + Fatigue, No fever or chills  EYES: No eye pain or discharge  ENMT:  No sinus or throat pain  NECK: No pain or stiffness  RESPIRATORY: No shortness of breath or cough  CARDIOVASCULAR: No chest pain or palpitations  GASTROINTESTINAL: No vomiting or abdominal pain  GENITOURINARY: No dysuria or hematuria  NEUROLOGICAL: No headaches or confusion  SKIN: + R foot ulcer, No itching   MUSCULOSKELETAL: No joint pain or back pain    Allergies    No Known Allergies    Intolerances    Social History: No smoking, EtOH, or illicit drug use    FAMILY HISTORY:  Family history of MI (myocardial infarction)        MEDICATIONS  (STANDING):  lactobacillus acidophilus 1 Tablet(s) Oral Once    MEDICATIONS  (PRN):      CAPILLARY BLOOD GLUCOSE        I&O's Summary    Vital Signs Last 24 Hrs  T(C): 36.5 (26 May 2021 09:10), Max: 36.6 (26 May 2021 09:00)  T(F): 97.7 (26 May 2021 09:10), Max: 97.9 (26 May 2021 09:00)  HR: 102 (26 May 2021 10:37) (99 - 113)  BP: 62/40 (26 May 2021 10:37) (51/31 - 99/84)  BP(mean): 46 (26 May 2021 10:37) (46 - 91)  RR: 24 (26 May 2021 10:37) (16 - 24)  SpO2: 97% (26 May 2021 10:37) (96% - 100%)    PHYSICAL EXAM:  GENERAL: NAD  HEENT: NC/AT, MMM  NECK: Supple  CHEST/LUNG: Respirations grossly nonlabored  HEART: RRR; +S1/S2  ABDOMEN: +BS, Soft, NT  EXTREMITIES: No LE edema  NEUROLOGY: Awake and alert, Answering questions and following commands appropriately  SKIN: Warm and dry  PSYCH: Calm and cooperative    LABS:                        10.2   9.00  )-----------( 8        ( 26 May 2021 09:41 )             32.6     05-26    132<L>  |  97  |  128<H>  ----------------------------<  241<H>  4.7   |  14<L>  |  6.96<H>    Ca    9.5      26 May 2021 09:41    TPro  6.8  /  Alb  3.7  /  TBili  1.7<H>  /  DBili  x   /  AST  21  /  ALT  12  /  AlkPhos  83  05-26    PT/INR - ( 26 May 2021 09:41 )   PT: 21.2 sec;   INR: 1.82 ratio         PTT - ( 26 May 2021 09:41 )  PTT:34.1 sec    RADIOLOGY & ADDITIONAL TESTS:  Studies reviewed.   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 Patient reported that he had been feeling well recently aside from some mild fatigue; however, he did not that his BP has been low lately. He took his BP this morning and it was in the 60s-70s and so he was instructed to go to the ED. Patient reports a low-grade temperature lately. He also endorses a R foot ulcer and some right ankle discomfort for which he has been following up with Podiatry. He denies chest pain, shortness of breath, nausea, vomiting, or abdominal pain. In the ED, patient was found to be hypotensive (SBP 50-90) and tachycardic (HR = 100s-110s). Labs were notable for a downtrending PLT count (8k this AM) and JULIANNE on CKD. R foot X-ray showed a chronic appearing medial angled deformity of the fifth metatarsal neck with erosive appearing changes along the lateral margin which could reflect acute or chronic osteomyelitis. Given patient's worsening thrombocytopenia, Hematology was consulted for further evaluation.    Patient was admitted to the MICU. Patient seen while in the MICU. His wife was at bedside. Above history was confirmed. He believes his BP has been lower over the past 10 days. Of note, patient's R foot was debrided ~ 1 week ago. Cultures from this debridement returned positive for MRSA and Enterococcus faecalis (sensitive to Vancomycin). No complaints of chest pain, shortness of breath, or abdominal pain. He denies any recent bleeding episodes.    Hematologic/Oncologic History:  Patient has a history of thrombocytopenia dating back to 2003 in the setting of liver disease(when he had AST/ALT abnormalities, but no diagnosis of cirrhosis). This thrombocytopenia has persisted and has been worsening recently. Patient has a history of HCC s/p SIRT in 4/2020. Patient's cirrhosis is suspected to be a main contributor to his thrombocytopenia, likely exacerbated by his splenomegaly and his prior SIRT (evidenced by lower numbers a few months after treatment). However, there also appears to be an ITP component as patient had a BMBx in 8/2020 which showed normal megakaryocytes and he had a slight response to steroids previously. Patient was placed on Nplate in 9/2020 with good response; however, this response has been blunted recently. He last received Nplate on 5/19/21. Patient follows with Dr. Tina Daniel (for HCC) and Dr. Tish Stringer (for thrombocytopenia) at the [facility].    PAST MEDICAL & SURGICAL HISTORY:  AICD lead malfunction  history of - fixed follow-up by Dr Rivers    DM (diabetes mellitus)  type 2    HTN (hypertension)    Thrombocytopenia  Chronic    Coronary artery disease    Pituitary adenoma  as per patient chronic, no changes , recently restarted follow up with endocrinologist ( note in allscripts )    Ischemic cardiomyopathy    Heart failure with reduced left ventricular function  last EF 3--35% 5/2019    Presence of stent in coronary artery  2 stents 1999, 2006    History of osteomyelitis  2015, right foot 2nd toe   follow-up by podiatrist every 2 weeks    Sleep apnea  not using CPAP    ICD (implantable cardioverter-defibrillator) in place  [employer], Old Model [id number] , last interrogation 3/27/19, generator change 4/3/19 New Model # [id number]    Anemia    Hepatic cirrhosis, unspecified hepatic cirrhosis type    History of hyperprolactinemia    PVD (peripheral vascular disease)    Hypothyroidism    Elevated triglycerides with high cholesterol  on meds    Vitamin D deficiency    Elevated prolactin level  dx: &#x27; 70&#x27;s  medically managed Bromocriptine    Gout  medically managed    Liver mass  dx: 10/2018   incidental finding. Currently awaitng furthur workup    Ulcer of right ankle  has surgery done Dec 2018    Afib  pt reports ~9 mos, [id number], resolved - monitored by Dr. iRvers    HCC (hepatocellular carcinoma)  s/p liver embolization x2 in 2019    Coronary atherosclerosis of artery bypass graft  CABG X 4 (1986)    Stented coronary artery  RCA (1999), another  stent 2000    AICD (automatic cardioverter/defibrillator) present  placement in (2006), generator change 4/3/19    History of amputation  right foot, 2nd toe, osteo 2015    Encounter for incision and drainage procedure  Dec 2018 right foot/ankle    SCC (squamous cell carcinoma)  facial SCC, s/p Mohs        Review of Systems:   CONSTITUTIONAL: + Fatigue, No fever or chills  EYES: No eye pain or discharge  ENMT:  No sinus or throat pain  NECK: No pain or stiffness  RESPIRATORY: No shortness of breath or cough  CARDIOVASCULAR: No chest pain or palpitations  GASTROINTESTINAL: No vomiting or abdominal pain  GENITOURINARY: No dysuria or hematuria  NEUROLOGICAL: No headaches or confusion  SKIN: + R foot ulcer, No itching   MUSCULOSKELETAL: No joint pain or back pain    Allergies    No Known Allergies    Intolerances    Social History: No smoking, EtOH, or illicit drug use    FAMILY HISTORY:  Family history of MI (myocardial infarction)        MEDICATIONS  (STANDING):  lactobacillus acidophilus 1 Tablet(s) Oral Once    MEDICATIONS  (PRN):      CAPILLARY BLOOD GLUCOSE        I&O's Summary    Vital Signs Last 24 Hrs  T(C): 36.5 (26 May 2021 09:10), Max: 36.6 (26 May 2021 09:00)  T(F): 97.7 (26 May 2021 09:10), Max: 97.9 (26 May 2021 09:00)  HR: 102 (26 May 2021 10:37) (99 - 113)  BP: 62/40 (26 May 2021 10:37) (51/31 - 99/84)  BP(mean): 46 (26 May 2021 10:37) (46 - 91)  RR: 24 (26 May 2021 10:37) (16 - 24)  SpO2: 97% (26 May 2021 10:37) (96% - 100%)    PHYSICAL EXAM:  GENERAL: NAD, Laying in bed  HEENT: NC/AT, Sclera anicteric  NECK: Supple  CHEST/LUNG: Respirations grossly nonlabored  HEART: RRR; +S1/S2  ABDOMEN: +BS, Soft, NT  EXTREMITIES: + R foot ulcer which is currently wrapped in gauze  NEUROLOGY: Awake and alert, Answering questions and following commands appropriately  SKIN: Warm and dry  PSYCH: Calm and cooperative    LABS:                        10.2   9.00  )-----------( 8        ( 26 May 2021 09:41 )             32.6     05-26    132<L>  |  97  |  128<H>  ----------------------------<  241<H>  4.7   |  14<L>  |  6.96<H>    Ca    9.5      26 May 2021 09:41    TPro  6.8  /  Alb  3.7  /  TBili  1.7<H>  /  DBili  x   /  AST  21  /  ALT  12  /  AlkPhos  83  05-26    PT/INR - ( 26 May 2021 09:41 )   PT: 21.2 sec;   INR: 1.82 ratio         PTT - ( 26 May 2021 09:41 )  PTT:34.1 sec    RADIOLOGY & ADDITIONAL TESTS:  Studies reviewed.   23-Feb-2017 00:32

## 2023-04-19 NOTE — ED ADULT NURSE NOTE - NS PRO PASSIVE SMOKE EXP
Call placed to patient, ID verified x 2. Patient  has decided with their surgeon to have a total knee replacement to decrease  pain and improve mobility . Topics discussed included surgery preparation, what to expect the day of surgery, medications, physical and occupational therapy, and discharge planning. It was discussed that this is considered an elective surgery and that prior to the surgery  decisions such as arranging for help at home once they are discharged needs to be made. Patient agreed to get home ready for surgery and to have a ride arranged to go home. She identifies her  as her support system, has all of her required DME, has picked up her postoperative medications and will be spending her first night in the hospital. She lives in a 2 story home with 13 steps that she must navigate. Instructions were given for CHG bathing. Patient will complete the procedure the morning of surgery. Patient was reminded not to apply any deoderant, perfumes, makeup or lotions to skin the morning of surgery. She will remain NPO after midnight the night before surgery other than sips of water up until 0630. She will take only the medications as instructed to take by her surgeon the morning of surgery with a sip of water. Patient was instructed to take her lexepro with a sip of water the morning of surgery. A total knee replacement education book will be provided to patient post op prior to discharge. Education regarding the importance of early and frequent ambulation to avoid surgical complications and to assist with pain was provided. Recommended the use of ice to assist with pain and swelling post op for 20 minutes an hour, not to be placed directly on her skin. Patient verbalized understanding of all information provided. Opportunity was given to ask questions and phone number of the Orthopaedic   was given for any questions or concerns that may arise later. Yes...

## 2024-07-19 NOTE — DIETITIAN INITIAL EVALUATION ADULT. - PT NOT SOURCE
Problem: Adult Inpatient Plan of Care  Goal: Plan of Care Review  Outcome: Progressing  Goal: Patient-Specific Goal (Individualized)  Outcome: Progressing  Goal: Absence of Hospital-Acquired Illness or Injury  Outcome: Progressing  Goal: Optimal Comfort and Wellbeing  Outcome: Progressing  Goal: Readiness for Transition of Care  Outcome: Progressing     Problem: Infection  Goal: Absence of Infection Signs and Symptoms  Outcome: Progressing     Problem: Wound  Goal: Optimal Coping  Outcome: Progressing  Goal: Optimal Functional Ability  Outcome: Progressing  Goal: Absence of Infection Signs and Symptoms  Outcome: Progressing  Goal: Improved Oral Intake  Outcome: Progressing  Goal: Optimal Pain Control and Function  Outcome: Progressing  Goal: Skin Health and Integrity  Outcome: Progressing  Goal: Optimal Wound Healing  Outcome: Progressing      sedated/intubated